# Patient Record
Sex: MALE | Race: WHITE | NOT HISPANIC OR LATINO | Employment: FULL TIME | URBAN - METROPOLITAN AREA
[De-identification: names, ages, dates, MRNs, and addresses within clinical notes are randomized per-mention and may not be internally consistent; named-entity substitution may affect disease eponyms.]

---

## 2019-10-26 ENCOUNTER — HOSPITAL ENCOUNTER (OUTPATIENT)
Facility: HOSPITAL | Age: 69
Setting detail: OBSERVATION
Discharge: HOME | End: 2019-10-28
Attending: EMERGENCY MEDICINE | Admitting: INTERNAL MEDICINE

## 2019-10-26 ENCOUNTER — APPOINTMENT (OUTPATIENT)
Dept: GENERAL RADIOLOGY | Facility: HOSPITAL | Age: 69
End: 2019-10-26

## 2019-10-26 DIAGNOSIS — R77.8 ELEVATED TROPONIN: ICD-10-CM

## 2019-10-26 DIAGNOSIS — Z95.1 STATUS POST CORONARY ARTERY BYPASS GRAFT: ICD-10-CM

## 2019-10-26 DIAGNOSIS — I50.9 ACUTE CONGESTIVE HEART FAILURE, UNSPECIFIED HEART FAILURE TYPE (HCC): Primary | ICD-10-CM

## 2019-10-26 DIAGNOSIS — J98.01 BRONCHOSPASM: ICD-10-CM

## 2019-10-26 LAB
ANION GAP SERPL CALCULATED.3IONS-SCNC: 12 MMOL/L (ref 5–15)
ANION GAP SERPL CALCULATED.3IONS-SCNC: 13 MMOL/L (ref 5–15)
BASOPHILS # BLD AUTO: 0.1 10*3/MM3 (ref 0–0.2)
BASOPHILS NFR BLD AUTO: 0.9 % (ref 0–1.5)
BUN BLD-MCNC: 22 MG/DL (ref 8–23)
BUN BLD-MCNC: 22 MG/DL (ref 8–23)
BUN/CREAT SERPL: 13.8 (ref 7–25)
BUN/CREAT SERPL: 14.2 (ref 7–25)
CALCIUM SPEC-SCNC: 8.7 MG/DL (ref 8.6–10.5)
CALCIUM SPEC-SCNC: 9 MG/DL (ref 8.6–10.5)
CHLORIDE SERPL-SCNC: 100 MMOL/L (ref 98–107)
CHLORIDE SERPL-SCNC: 95 MMOL/L (ref 98–107)
CO2 SERPL-SCNC: 25 MMOL/L (ref 22–29)
CO2 SERPL-SCNC: 25 MMOL/L (ref 22–29)
CREAT BLD-MCNC: 1.55 MG/DL (ref 0.76–1.27)
CREAT BLD-MCNC: 1.6 MG/DL (ref 0.76–1.27)
DEPRECATED RDW RBC AUTO: 50.3 FL (ref 37–54)
EOSINOPHIL # BLD AUTO: 0.1 10*3/MM3 (ref 0–0.4)
EOSINOPHIL NFR BLD AUTO: 0.9 % (ref 0.3–6.2)
ERYTHROCYTE [DISTWIDTH] IN BLOOD BY AUTOMATED COUNT: 16.6 % (ref 12.3–15.4)
GFR SERPL CREATININE-BSD FRML MDRD: 43 ML/MIN/1.73
GFR SERPL CREATININE-BSD FRML MDRD: 45 ML/MIN/1.73
GLUCOSE BLD-MCNC: 147 MG/DL (ref 65–99)
GLUCOSE BLD-MCNC: 182 MG/DL (ref 65–99)
HCT VFR BLD AUTO: 33.8 % (ref 37.5–51)
HGB BLD-MCNC: 11 G/DL (ref 13–17.7)
INR PPP: 1.64 (ref 2–3)
INR PPP: 1.87 (ref 2–3)
LYMPHOCYTES # BLD AUTO: 1 10*3/MM3 (ref 0.7–3.1)
LYMPHOCYTES NFR BLD AUTO: 8.3 % (ref 19.6–45.3)
MCH RBC QN AUTO: 27.6 PG (ref 26.6–33)
MCHC RBC AUTO-ENTMCNC: 32.6 G/DL (ref 31.5–35.7)
MCV RBC AUTO: 84.6 FL (ref 79–97)
MONOCYTES # BLD AUTO: 1 10*3/MM3 (ref 0.1–0.9)
MONOCYTES NFR BLD AUTO: 7.7 % (ref 5–12)
NEUTROPHILS # BLD AUTO: 10.1 10*3/MM3 (ref 1.7–7)
NEUTROPHILS NFR BLD AUTO: 82.2 % (ref 42.7–76)
NRBC BLD AUTO-RTO: 0.1 /100 WBC (ref 0–0.2)
NT-PROBNP SERPL-MCNC: 2312 PG/ML (ref 5–900)
NT-PROBNP SERPL-MCNC: 2763 PG/ML (ref 5–900)
PLATELET # BLD AUTO: 555 10*3/MM3 (ref 140–450)
PMV BLD AUTO: 8.4 FL (ref 6–12)
POTASSIUM BLD-SCNC: 4.4 MMOL/L (ref 3.5–5.2)
POTASSIUM BLD-SCNC: 4.7 MMOL/L (ref 3.5–5.2)
PROTHROMBIN TIME: 16 SECONDS (ref 19.4–28.5)
PROTHROMBIN TIME: 18 SECONDS (ref 19.4–28.5)
RBC # BLD AUTO: 4 10*6/MM3 (ref 4.14–5.8)
SODIUM BLD-SCNC: 133 MMOL/L (ref 136–145)
SODIUM BLD-SCNC: 137 MMOL/L (ref 136–145)
TROPONIN T SERPL-MCNC: 0.13 NG/ML (ref 0–0.03)
TROPONIN T SERPL-MCNC: 0.14 NG/ML (ref 0–0.03)
TROPONIN T SERPL-MCNC: 0.14 NG/ML (ref 0–0.03)
TROPONIN T SERPL-MCNC: 0.15 NG/ML (ref 0–0.03)
WBC NRBC COR # BLD: 12.3 10*3/MM3 (ref 3.4–10.8)

## 2019-10-26 PROCEDURE — 84484 ASSAY OF TROPONIN QUANT: CPT | Performed by: NURSE PRACTITIONER

## 2019-10-26 PROCEDURE — G0378 HOSPITAL OBSERVATION PER HR: HCPCS

## 2019-10-26 PROCEDURE — 93005 ELECTROCARDIOGRAM TRACING: CPT | Performed by: EMERGENCY MEDICINE

## 2019-10-26 PROCEDURE — 80048 BASIC METABOLIC PNL TOTAL CA: CPT | Performed by: EMERGENCY MEDICINE

## 2019-10-26 PROCEDURE — 25010000002 FUROSEMIDE PER 20 MG: Performed by: EMERGENCY MEDICINE

## 2019-10-26 PROCEDURE — 83880 ASSAY OF NATRIURETIC PEPTIDE: CPT | Performed by: NURSE PRACTITIONER

## 2019-10-26 PROCEDURE — 99220 PR INITIAL OBSERVATION CARE/DAY 70 MINUTES: CPT | Performed by: INTERNAL MEDICINE

## 2019-10-26 PROCEDURE — 94640 AIRWAY INHALATION TREATMENT: CPT

## 2019-10-26 PROCEDURE — 71045 X-RAY EXAM CHEST 1 VIEW: CPT

## 2019-10-26 PROCEDURE — 84484 ASSAY OF TROPONIN QUANT: CPT | Performed by: EMERGENCY MEDICINE

## 2019-10-26 PROCEDURE — 85025 COMPLETE CBC W/AUTO DIFF WBC: CPT | Performed by: EMERGENCY MEDICINE

## 2019-10-26 PROCEDURE — 80048 BASIC METABOLIC PNL TOTAL CA: CPT | Performed by: NURSE PRACTITIONER

## 2019-10-26 PROCEDURE — 96376 TX/PRO/DX INJ SAME DRUG ADON: CPT

## 2019-10-26 PROCEDURE — 85610 PROTHROMBIN TIME: CPT | Performed by: INTERNAL MEDICINE

## 2019-10-26 PROCEDURE — 94799 UNLISTED PULMONARY SVC/PX: CPT

## 2019-10-26 PROCEDURE — 83880 ASSAY OF NATRIURETIC PEPTIDE: CPT | Performed by: EMERGENCY MEDICINE

## 2019-10-26 PROCEDURE — 25010000002 FUROSEMIDE PER 20 MG: Performed by: NURSE PRACTITIONER

## 2019-10-26 PROCEDURE — 99205 OFFICE O/P NEW HI 60 MIN: CPT | Performed by: NURSE PRACTITIONER

## 2019-10-26 PROCEDURE — 96374 THER/PROPH/DIAG INJ IV PUSH: CPT

## 2019-10-26 PROCEDURE — 99285 EMERGENCY DEPT VISIT HI MDM: CPT

## 2019-10-26 PROCEDURE — 94760 N-INVAS EAR/PLS OXIMETRY 1: CPT

## 2019-10-26 PROCEDURE — 85610 PROTHROMBIN TIME: CPT | Performed by: EMERGENCY MEDICINE

## 2019-10-26 RX ORDER — WARFARIN SODIUM 5 MG/1
5 TABLET ORAL
Status: DISCONTINUED | OUTPATIENT
Start: 2019-10-26 | End: 2019-10-26 | Stop reason: SDUPTHER

## 2019-10-26 RX ORDER — PANTOPRAZOLE SODIUM 40 MG/1
40 TABLET, DELAYED RELEASE ORAL EVERY MORNING
Status: DISCONTINUED | OUTPATIENT
Start: 2019-10-27 | End: 2019-10-28 | Stop reason: HOSPADM

## 2019-10-26 RX ORDER — TRAMADOL HYDROCHLORIDE 50 MG/1
50 TABLET ORAL EVERY 6 HOURS PRN
Status: DISCONTINUED | OUTPATIENT
Start: 2019-10-26 | End: 2019-10-28 | Stop reason: HOSPADM

## 2019-10-26 RX ORDER — CARVEDILOL 6.25 MG/1
12.5 TABLET ORAL 2 TIMES DAILY WITH MEALS
Status: DISCONTINUED | OUTPATIENT
Start: 2019-10-26 | End: 2019-10-27

## 2019-10-26 RX ORDER — ASPIRIN 81 MG/1
81 TABLET, CHEWABLE ORAL DAILY
Status: DISCONTINUED | OUTPATIENT
Start: 2019-10-26 | End: 2019-10-26

## 2019-10-26 RX ORDER — ATORVASTATIN CALCIUM 40 MG/1
40 TABLET, FILM COATED ORAL NIGHTLY
Status: DISCONTINUED | OUTPATIENT
Start: 2019-10-26 | End: 2019-10-28 | Stop reason: HOSPADM

## 2019-10-26 RX ORDER — FUROSEMIDE 40 MG/1
40 TABLET ORAL DAILY
Status: DISCONTINUED | OUTPATIENT
Start: 2019-10-28 | End: 2019-10-28 | Stop reason: HOSPADM

## 2019-10-26 RX ORDER — WARFARIN SODIUM 5 MG/1
5 TABLET ORAL
COMMUNITY

## 2019-10-26 RX ORDER — AMIODARONE HYDROCHLORIDE 200 MG/1
200 TABLET ORAL
Status: DISCONTINUED | OUTPATIENT
Start: 2019-10-26 | End: 2019-10-26

## 2019-10-26 RX ORDER — ATORVASTATIN CALCIUM 40 MG/1
40 TABLET, FILM COATED ORAL DAILY
Status: DISCONTINUED | OUTPATIENT
Start: 2019-10-26 | End: 2019-10-26

## 2019-10-26 RX ORDER — ATORVASTATIN CALCIUM 40 MG/1
40 TABLET, FILM COATED ORAL DAILY
COMMUNITY

## 2019-10-26 RX ORDER — ASPIRIN 81 MG/1
81 TABLET, CHEWABLE ORAL DAILY
COMMUNITY

## 2019-10-26 RX ORDER — ASPIRIN 81 MG/1
81 TABLET ORAL DAILY
Status: DISCONTINUED | OUTPATIENT
Start: 2019-10-26 | End: 2019-10-26

## 2019-10-26 RX ORDER — CARVEDILOL 12.5 MG/1
12.5 TABLET ORAL 2 TIMES DAILY WITH MEALS
COMMUNITY

## 2019-10-26 RX ORDER — WARFARIN SODIUM 5 MG/1
5 TABLET ORAL
Status: DISCONTINUED | OUTPATIENT
Start: 2019-10-26 | End: 2019-10-28

## 2019-10-26 RX ORDER — AMIODARONE HYDROCHLORIDE 200 MG/1
TABLET ORAL 3 TIMES DAILY
COMMUNITY

## 2019-10-26 RX ORDER — SODIUM CHLORIDE 0.9 % (FLUSH) 0.9 %
10 SYRINGE (ML) INJECTION AS NEEDED
Status: DISCONTINUED | OUTPATIENT
Start: 2019-10-26 | End: 2019-10-28 | Stop reason: HOSPADM

## 2019-10-26 RX ORDER — FUROSEMIDE 10 MG/ML
40 INJECTION INTRAMUSCULAR; INTRAVENOUS
Status: COMPLETED | OUTPATIENT
Start: 2019-10-26 | End: 2019-10-27

## 2019-10-26 RX ORDER — SODIUM CHLORIDE 0.9 % (FLUSH) 0.9 %
10 SYRINGE (ML) INJECTION EVERY 12 HOURS SCHEDULED
Status: DISCONTINUED | OUTPATIENT
Start: 2019-10-26 | End: 2019-10-28 | Stop reason: HOSPADM

## 2019-10-26 RX ORDER — ALBUTEROL SULFATE 2.5 MG/3ML
2.5 SOLUTION RESPIRATORY (INHALATION) EVERY 6 HOURS PRN
Status: DISCONTINUED | OUTPATIENT
Start: 2019-10-26 | End: 2019-10-27

## 2019-10-26 RX ORDER — AMIODARONE HYDROCHLORIDE 200 MG/1
400 TABLET ORAL 3 TIMES DAILY
Status: DISCONTINUED | OUTPATIENT
Start: 2019-10-26 | End: 2019-10-28 | Stop reason: HOSPADM

## 2019-10-26 RX ORDER — AMIODARONE HYDROCHLORIDE 200 MG/1
200 TABLET ORAL
Status: DISCONTINUED | OUTPATIENT
Start: 2019-11-09 | End: 2019-10-28 | Stop reason: HOSPADM

## 2019-10-26 RX ORDER — ATORVASTATIN CALCIUM 40 MG/1
40 TABLET, FILM COATED ORAL DAILY
COMMUNITY
Start: 2019-10-26 | End: 2019-11-25

## 2019-10-26 RX ORDER — AMIODARONE HYDROCHLORIDE 400 MG/1
400 TABLET ORAL 3 TIMES DAILY
COMMUNITY
Start: 2019-10-25 | End: 2019-10-28 | Stop reason: HOSPADM

## 2019-10-26 RX ORDER — ASPIRIN 81 MG/1
81 TABLET ORAL DAILY
Status: DISCONTINUED | OUTPATIENT
Start: 2019-10-26 | End: 2019-10-28 | Stop reason: HOSPADM

## 2019-10-26 RX ORDER — ASPIRIN 81 MG/1
81 TABLET ORAL DAILY
COMMUNITY
Start: 2019-10-26 | End: 2019-10-28 | Stop reason: HOSPADM

## 2019-10-26 RX ORDER — WARFARIN SODIUM 5 MG/1
TABLET ORAL
COMMUNITY
Start: 2019-10-26 | End: 2019-10-28 | Stop reason: HOSPADM

## 2019-10-26 RX ORDER — ALBUTEROL SULFATE 2.5 MG/3ML
2.5 SOLUTION RESPIRATORY (INHALATION) ONCE
Status: COMPLETED | OUTPATIENT
Start: 2019-10-26 | End: 2019-10-26

## 2019-10-26 RX ORDER — FUROSEMIDE 10 MG/ML
40 INJECTION INTRAMUSCULAR; INTRAVENOUS ONCE
Status: COMPLETED | OUTPATIENT
Start: 2019-10-26 | End: 2019-10-26

## 2019-10-26 RX ORDER — GUAIFENESIN 600 MG/1
600 TABLET, EXTENDED RELEASE ORAL EVERY 12 HOURS SCHEDULED
Status: DISCONTINUED | OUTPATIENT
Start: 2019-10-26 | End: 2019-10-28 | Stop reason: HOSPADM

## 2019-10-26 RX ORDER — TRAMADOL HYDROCHLORIDE 50 MG/1
50 TABLET ORAL EVERY 6 HOURS PRN
COMMUNITY

## 2019-10-26 RX ADMIN — FUROSEMIDE 40 MG: 10 INJECTION, SOLUTION INTRAVENOUS at 05:50

## 2019-10-26 RX ADMIN — ALBUTEROL SULFATE 2.5 MG: 2.5 SOLUTION RESPIRATORY (INHALATION) at 18:34

## 2019-10-26 RX ADMIN — ALBUTEROL SULFATE 2.5 MG: 2.5 SOLUTION RESPIRATORY (INHALATION) at 05:38

## 2019-10-26 RX ADMIN — CARVEDILOL 12.5 MG: 6.25 TABLET, FILM COATED ORAL at 18:14

## 2019-10-26 RX ADMIN — Medication 10 ML: at 09:45

## 2019-10-26 RX ADMIN — AMIODARONE HYDROCHLORIDE 400 MG: 200 TABLET ORAL at 14:54

## 2019-10-26 RX ADMIN — AMIODARONE HYDROCHLORIDE 400 MG: 200 TABLET ORAL at 21:43

## 2019-10-26 RX ADMIN — ASPIRIN 81 MG: 81 TABLET, COATED ORAL at 14:55

## 2019-10-26 RX ADMIN — WARFARIN SODIUM 5 MG: 5 TABLET ORAL at 18:14

## 2019-10-26 RX ADMIN — GUAIFENESIN 600 MG: 600 TABLET, EXTENDED RELEASE ORAL at 21:43

## 2019-10-26 RX ADMIN — ATORVASTATIN CALCIUM 40 MG: 40 TABLET, FILM COATED ORAL at 21:43

## 2019-10-26 RX ADMIN — Medication 10 ML: at 21:44

## 2019-10-26 RX ADMIN — FUROSEMIDE 40 MG: 10 INJECTION, SOLUTION INTRAMUSCULAR; INTRAVENOUS at 18:14

## 2019-10-27 PROBLEM — Z95.1 STATUS POST CORONARY ARTERY BYPASS GRAFT: Status: ACTIVE | Noted: 2019-10-27

## 2019-10-27 PROBLEM — R77.8 ELEVATED TROPONIN: Status: ACTIVE | Noted: 2019-10-27

## 2019-10-27 PROBLEM — R79.89 ELEVATED TROPONIN: Status: ACTIVE | Noted: 2019-10-27

## 2019-10-27 PROBLEM — K21.9 GERD (GASTROESOPHAGEAL REFLUX DISEASE): Chronic | Status: ACTIVE | Noted: 2019-10-27

## 2019-10-27 LAB
ANION GAP SERPL CALCULATED.3IONS-SCNC: 14 MMOL/L (ref 5–15)
BUN BLD-MCNC: 22 MG/DL (ref 8–23)
BUN/CREAT SERPL: 15.2 (ref 7–25)
CALCIUM SPEC-SCNC: 8.9 MG/DL (ref 8.6–10.5)
CHLORIDE SERPL-SCNC: 98 MMOL/L (ref 98–107)
CO2 SERPL-SCNC: 25 MMOL/L (ref 22–29)
CREAT BLD-MCNC: 1.45 MG/DL (ref 0.76–1.27)
GFR SERPL CREATININE-BSD FRML MDRD: 48 ML/MIN/1.73
GLUCOSE BLD-MCNC: 148 MG/DL (ref 65–99)
INR PPP: 2.06 (ref 2–3)
POTASSIUM BLD-SCNC: 4.1 MMOL/L (ref 3.5–5.2)
PROTHROMBIN TIME: 19.8 SECONDS (ref 19.4–28.5)
SODIUM BLD-SCNC: 137 MMOL/L (ref 136–145)

## 2019-10-27 PROCEDURE — 99225 PR SBSQ OBSERVATION CARE/DAY 25 MINUTES: CPT | Performed by: INTERNAL MEDICINE

## 2019-10-27 PROCEDURE — 94640 AIRWAY INHALATION TREATMENT: CPT

## 2019-10-27 PROCEDURE — 94799 UNLISTED PULMONARY SVC/PX: CPT

## 2019-10-27 PROCEDURE — 96376 TX/PRO/DX INJ SAME DRUG ADON: CPT

## 2019-10-27 PROCEDURE — 99214 OFFICE O/P EST MOD 30 MIN: CPT | Performed by: NURSE PRACTITIONER

## 2019-10-27 PROCEDURE — 80048 BASIC METABOLIC PNL TOTAL CA: CPT | Performed by: NURSE PRACTITIONER

## 2019-10-27 PROCEDURE — 85610 PROTHROMBIN TIME: CPT | Performed by: INTERNAL MEDICINE

## 2019-10-27 PROCEDURE — 25010000002 FUROSEMIDE PER 20 MG: Performed by: NURSE PRACTITIONER

## 2019-10-27 PROCEDURE — G0378 HOSPITAL OBSERVATION PER HR: HCPCS

## 2019-10-27 RX ORDER — CARVEDILOL 6.25 MG/1
6.25 TABLET ORAL 2 TIMES DAILY WITH MEALS
Status: DISCONTINUED | OUTPATIENT
Start: 2019-10-27 | End: 2019-10-28 | Stop reason: HOSPADM

## 2019-10-27 RX ORDER — ARFORMOTEROL TARTRATE 15 UG/2ML
15 SOLUTION RESPIRATORY (INHALATION)
Status: DISCONTINUED | OUTPATIENT
Start: 2019-10-27 | End: 2019-10-28 | Stop reason: HOSPADM

## 2019-10-27 RX ORDER — IPRATROPIUM BROMIDE AND ALBUTEROL SULFATE 2.5; .5 MG/3ML; MG/3ML
3 SOLUTION RESPIRATORY (INHALATION)
Status: DISCONTINUED | OUTPATIENT
Start: 2019-10-27 | End: 2019-10-28 | Stop reason: HOSPADM

## 2019-10-27 RX ORDER — BUDESONIDE 0.5 MG/2ML
0.5 INHALANT ORAL
Status: DISCONTINUED | OUTPATIENT
Start: 2019-10-27 | End: 2019-10-28 | Stop reason: HOSPADM

## 2019-10-27 RX ADMIN — ASPIRIN 81 MG: 81 TABLET, COATED ORAL at 09:45

## 2019-10-27 RX ADMIN — FUROSEMIDE 40 MG: 10 INJECTION, SOLUTION INTRAMUSCULAR; INTRAVENOUS at 09:44

## 2019-10-27 RX ADMIN — ATORVASTATIN CALCIUM 40 MG: 40 TABLET, FILM COATED ORAL at 20:46

## 2019-10-27 RX ADMIN — PANTOPRAZOLE SODIUM 40 MG: 40 TABLET, DELAYED RELEASE ORAL at 06:16

## 2019-10-27 RX ADMIN — WARFARIN SODIUM 5 MG: 5 TABLET ORAL at 17:41

## 2019-10-27 RX ADMIN — CARVEDILOL 12.5 MG: 6.25 TABLET, FILM COATED ORAL at 08:00

## 2019-10-27 RX ADMIN — CARVEDILOL 6.25 MG: 6.25 TABLET, FILM COATED ORAL at 17:41

## 2019-10-27 RX ADMIN — ALBUTEROL SULFATE 2.5 MG: 2.5 SOLUTION RESPIRATORY (INHALATION) at 05:18

## 2019-10-27 RX ADMIN — AMIODARONE HYDROCHLORIDE 400 MG: 200 TABLET ORAL at 16:00

## 2019-10-27 RX ADMIN — Medication 10 ML: at 09:44

## 2019-10-27 RX ADMIN — Medication 10 ML: at 20:46

## 2019-10-27 RX ADMIN — IPRATROPIUM BROMIDE AND ALBUTEROL SULFATE 3 ML: .5; 3 SOLUTION RESPIRATORY (INHALATION) at 14:52

## 2019-10-27 RX ADMIN — GUAIFENESIN 600 MG: 600 TABLET, EXTENDED RELEASE ORAL at 20:46

## 2019-10-27 RX ADMIN — IPRATROPIUM BROMIDE AND ALBUTEROL SULFATE 3 ML: .5; 3 SOLUTION RESPIRATORY (INHALATION) at 19:57

## 2019-10-27 RX ADMIN — ARFORMOTEROL TARTRATE 15 MCG: 15 SOLUTION RESPIRATORY (INHALATION) at 19:57

## 2019-10-27 RX ADMIN — BUDESONIDE 0.5 MG: 0.5 INHALANT RESPIRATORY (INHALATION) at 19:57

## 2019-10-27 RX ADMIN — GUAIFENESIN 600 MG: 600 TABLET, EXTENDED RELEASE ORAL at 09:45

## 2019-10-27 RX ADMIN — AMIODARONE HYDROCHLORIDE 400 MG: 200 TABLET ORAL at 20:46

## 2019-10-27 RX ADMIN — AMIODARONE HYDROCHLORIDE 400 MG: 200 TABLET ORAL at 09:44

## 2019-10-28 VITALS
OXYGEN SATURATION: 98 % | HEART RATE: 80 BPM | RESPIRATION RATE: 18 BRPM | HEIGHT: 75 IN | WEIGHT: 209.66 LBS | DIASTOLIC BLOOD PRESSURE: 67 MMHG | SYSTOLIC BLOOD PRESSURE: 101 MMHG | TEMPERATURE: 97.4 F | BODY MASS INDEX: 26.07 KG/M2

## 2019-10-28 LAB
INR PPP: 2.48 (ref 2–3)
PROTHROMBIN TIME: 23.5 SECONDS (ref 19.4–28.5)

## 2019-10-28 PROCEDURE — 99217 PR OBSERVATION CARE DISCHARGE MANAGEMENT: CPT | Performed by: INTERNAL MEDICINE

## 2019-10-28 PROCEDURE — 94799 UNLISTED PULMONARY SVC/PX: CPT

## 2019-10-28 PROCEDURE — 99214 OFFICE O/P EST MOD 30 MIN: CPT | Performed by: INTERNAL MEDICINE

## 2019-10-28 PROCEDURE — 85610 PROTHROMBIN TIME: CPT | Performed by: INTERNAL MEDICINE

## 2019-10-28 PROCEDURE — 94618 PULMONARY STRESS TESTING: CPT

## 2019-10-28 PROCEDURE — G0378 HOSPITAL OBSERVATION PER HR: HCPCS

## 2019-10-28 PROCEDURE — 97116 GAIT TRAINING THERAPY: CPT

## 2019-10-28 PROCEDURE — 97162 PT EVAL MOD COMPLEX 30 MIN: CPT

## 2019-10-28 RX ORDER — WARFARIN SODIUM 4 MG/1
4 TABLET ORAL
Status: DISCONTINUED | OUTPATIENT
Start: 2019-10-28 | End: 2019-10-28 | Stop reason: HOSPADM

## 2019-10-28 RX ORDER — LISINOPRIL 5 MG/1
2.5 TABLET ORAL
Status: DISCONTINUED | OUTPATIENT
Start: 2019-10-28 | End: 2019-10-28 | Stop reason: HOSPADM

## 2019-10-28 RX ORDER — LISINOPRIL 2.5 MG/1
2.5 TABLET ORAL
Qty: 30 TABLET | Refills: 1 | Status: SHIPPED | OUTPATIENT
Start: 2019-10-29

## 2019-10-28 RX ORDER — FUROSEMIDE 40 MG/1
40 TABLET ORAL DAILY
Qty: 30 TABLET | Refills: 2 | Status: SHIPPED | OUTPATIENT
Start: 2019-10-29

## 2019-10-28 RX ADMIN — PANTOPRAZOLE SODIUM 40 MG: 40 TABLET, DELAYED RELEASE ORAL at 05:43

## 2019-10-28 RX ADMIN — CARVEDILOL 6.25 MG: 6.25 TABLET, FILM COATED ORAL at 08:43

## 2019-10-28 RX ADMIN — IPRATROPIUM BROMIDE AND ALBUTEROL SULFATE 3 ML: .5; 3 SOLUTION RESPIRATORY (INHALATION) at 10:41

## 2019-10-28 RX ADMIN — PANTOPRAZOLE SODIUM 40 MG: 40 TABLET, DELAYED RELEASE ORAL at 08:43

## 2019-10-28 RX ADMIN — IPRATROPIUM BROMIDE AND ALBUTEROL SULFATE 3 ML: .5; 3 SOLUTION RESPIRATORY (INHALATION) at 06:52

## 2019-10-28 RX ADMIN — FUROSEMIDE 40 MG: 40 TABLET ORAL at 08:39

## 2019-10-28 RX ADMIN — IPRATROPIUM BROMIDE AND ALBUTEROL SULFATE 3 ML: .5; 3 SOLUTION RESPIRATORY (INHALATION) at 14:30

## 2019-10-28 RX ADMIN — ASPIRIN 81 MG: 81 TABLET, COATED ORAL at 08:43

## 2019-10-28 RX ADMIN — BUDESONIDE 0.5 MG: 0.5 INHALANT RESPIRATORY (INHALATION) at 06:53

## 2019-10-28 RX ADMIN — GUAIFENESIN 600 MG: 600 TABLET, EXTENDED RELEASE ORAL at 08:38

## 2019-10-28 RX ADMIN — ARFORMOTEROL TARTRATE 15 MCG: 15 SOLUTION RESPIRATORY (INHALATION) at 06:52

## 2019-10-28 RX ADMIN — LISINOPRIL 2.5 MG: 5 TABLET ORAL at 13:03

## 2019-10-28 RX ADMIN — AMIODARONE HYDROCHLORIDE 400 MG: 200 TABLET ORAL at 08:38

## 2019-10-28 RX ADMIN — Medication 10 ML: at 08:43

## 2019-10-29 ENCOUNTER — READMISSION MANAGEMENT (OUTPATIENT)
Dept: CALL CENTER | Facility: HOSPITAL | Age: 69
End: 2019-10-29

## 2019-10-29 NOTE — OUTREACH NOTE
Prep Survey      Responses   Facility patient discharged from?  [facility]   Is patient eligible?  Yes   Discharge diagnosis  Acute CHF   Does the patient have one of the following disease processes/diagnoses(primary or secondary)?  CHF   Does the patient have Home health ordered?  No   Is there a DME ordered?  No   Medication alerts for this patient  coumadin change   Prep survey completed?  Yes          Elizabeth Aponte RN

## 2019-10-30 ENCOUNTER — READMISSION MANAGEMENT (OUTPATIENT)
Dept: CALL CENTER | Facility: HOSPITAL | Age: 69
End: 2019-10-30

## 2019-10-30 NOTE — OUTREACH NOTE
CHF Week 1 Survey      Responses   Facility patient discharged from?  [facility]   Does the patient have one of the following disease processes/diagnoses(primary or secondary)?  CHF   Is there a successful TCM telephone encounter documented?  No   CHF Week 1 attempt successful?  No   Call start time  0850   Unsuccessful attempts  Attempt 1 [No answer/Left voicemail]          Niecy Esquivel LPN

## 2019-10-31 ENCOUNTER — READMISSION MANAGEMENT (OUTPATIENT)
Dept: CALL CENTER | Facility: HOSPITAL | Age: 69
End: 2019-10-31

## 2019-10-31 NOTE — OUTREACH NOTE
CHF Week 1 Survey      Responses   Facility patient discharged from?  [facility]   Does the patient have one of the following disease processes/diagnoses(primary or secondary)?  CHF   Is there a successful TCM telephone encounter documented?  No   CHF Week 1 attempt successful?  No   Unsuccessful attempts  Attempt 2 [NO ANSWER, LEFT VM]      RETURNED CALL. PATIENT VOICES NO QUESTIONS OR NEEDS,    Indu Berman LPN

## 2019-12-12 ENCOUNTER — TELEPHONE (OUTPATIENT)
Dept: CARDIAC REHAB | Facility: HOSPITAL | Age: 69
End: 2019-12-12

## 2019-12-12 NOTE — TELEPHONE ENCOUNTER
Pt called in returning my earlier call regarding his insurance verification. Told him of his 20% he would have to pay for CR. Pt states that [facility] told him it would only cost $30, so he is going to call them again to double check.

## 2023-04-20 ENCOUNTER — HOSPITAL ENCOUNTER (INPATIENT)
Facility: HOSPITAL | Age: 73
LOS: 1 days | Discharge: HOME | DRG: 291 | End: 2023-04-22
Attending: EMERGENCY MEDICINE | Admitting: INTERNAL MEDICINE
Payer: MEDICARE

## 2023-04-20 ENCOUNTER — APPOINTMENT (OUTPATIENT)
Dept: GENERAL RADIOLOGY | Facility: HOSPITAL | Age: 73
DRG: 291 | End: 2023-04-20
Payer: MEDICARE

## 2023-04-20 DIAGNOSIS — I48.91 ATRIAL FIBRILLATION WITH RVR: ICD-10-CM

## 2023-04-20 DIAGNOSIS — J96.01 ACUTE RESPIRATORY FAILURE WITH HYPOXIA: ICD-10-CM

## 2023-04-20 DIAGNOSIS — J81.0 ACUTE PULMONARY EDEMA: Primary | ICD-10-CM

## 2023-04-20 DIAGNOSIS — I48.91 ATRIAL FIBRILLATION, UNSPECIFIED TYPE: ICD-10-CM

## 2023-04-20 DIAGNOSIS — I16.0 HYPERTENSIVE URGENCY: ICD-10-CM

## 2023-04-20 PROBLEM — N17.9 AKI (ACUTE KIDNEY INJURY): Status: ACTIVE | Noted: 2023-04-20

## 2023-04-20 PROBLEM — I48.0 PAF (PAROXYSMAL ATRIAL FIBRILLATION): Status: ACTIVE | Noted: 2023-04-20

## 2023-04-20 PROBLEM — R06.00 DYSPNEA: Status: ACTIVE | Noted: 2023-04-20

## 2023-04-20 PROBLEM — J18.9 PNEUMONIA OF BOTH LUNGS DUE TO INFECTIOUS ORGANISM: Status: ACTIVE | Noted: 2023-04-20

## 2023-04-20 PROBLEM — J44.9 COPD (CHRONIC OBSTRUCTIVE PULMONARY DISEASE): Status: ACTIVE | Noted: 2023-04-20

## 2023-04-20 PROBLEM — R97.20 BPH WITH ELEVATED PSA: Status: ACTIVE | Noted: 2022-03-16

## 2023-04-20 PROBLEM — N40.0 BPH WITH ELEVATED PSA: Status: ACTIVE | Noted: 2022-03-16

## 2023-04-20 PROBLEM — I25.10 CAD (CORONARY ARTERY DISEASE): Status: ACTIVE | Noted: 2019-10-16

## 2023-04-20 LAB
ALBUMIN SERPL-MCNC: 4.2 G/DL (ref 3.5–5.2)
ALBUMIN/GLOB SERPL: 1.2 G/DL
ALP SERPL-CCNC: 73 U/L (ref 39–117)
ALT SERPL W P-5'-P-CCNC: 21 U/L (ref 1–41)
ANION GAP SERPL CALCULATED.3IONS-SCNC: 12 MMOL/L (ref 5–15)
AST SERPL-CCNC: 16 U/L (ref 1–40)
B PARAPERT DNA SPEC QL NAA+PROBE: NOT DETECTED
B PERT DNA SPEC QL NAA+PROBE: NOT DETECTED
BASOPHILS # BLD AUTO: 0.05 10*3/MM3 (ref 0–0.2)
BASOPHILS NFR BLD AUTO: 0.7 % (ref 0–1.5)
BILIRUB SERPL-MCNC: 0.6 MG/DL (ref 0–1.2)
BUN SERPL-MCNC: 15 MG/DL (ref 8–23)
BUN/CREAT SERPL: 11.1 (ref 7–25)
C PNEUM DNA NPH QL NAA+NON-PROBE: NOT DETECTED
CALCIUM SPEC-SCNC: 9.2 MG/DL (ref 8.6–10.5)
CHLORIDE SERPL-SCNC: 101 MMOL/L (ref 98–107)
CO2 SERPL-SCNC: 24 MMOL/L (ref 22–29)
CREAT SERPL-MCNC: 1.35 MG/DL (ref 0.76–1.27)
D DIMER PPP FEU-MCNC: 0.56 MCGFEU/ML (ref 0–0.74)
D-LACTATE SERPL-SCNC: 1.9 MMOL/L (ref 0.5–2)
DEPRECATED RDW RBC AUTO: 47.4 FL (ref 37–54)
EGFRCR SERPLBLD CKD-EPI 2021: 55.1 ML/MIN/1.73
EOSINOPHIL # BLD AUTO: 0.22 10*3/MM3 (ref 0–0.4)
EOSINOPHIL NFR BLD AUTO: 3.2 % (ref 0.3–6.2)
ERYTHROCYTE [DISTWIDTH] IN BLOOD BY AUTOMATED COUNT: 14.8 % (ref 12.3–15.4)
FLUAV SUBTYP SPEC NAA+PROBE: NOT DETECTED
FLUBV RNA ISLT QL NAA+PROBE: NOT DETECTED
GEN 5 2HR TROPONIN T REFLEX: 33 NG/L
GLOBULIN UR ELPH-MCNC: 3.4 GM/DL
GLUCOSE SERPL-MCNC: 123 MG/DL (ref 65–99)
HADV DNA SPEC NAA+PROBE: NOT DETECTED
HCOV 229E RNA SPEC QL NAA+PROBE: NOT DETECTED
HCOV HKU1 RNA SPEC QL NAA+PROBE: NOT DETECTED
HCOV NL63 RNA SPEC QL NAA+PROBE: NOT DETECTED
HCOV OC43 RNA SPEC QL NAA+PROBE: NOT DETECTED
HCT VFR BLD AUTO: 47.2 % (ref 37.5–51)
HGB BLD-MCNC: 14.5 G/DL (ref 13–17.7)
HMPV RNA NPH QL NAA+NON-PROBE: NOT DETECTED
HOLD SPECIMEN: NORMAL
HPIV1 RNA ISLT QL NAA+PROBE: NOT DETECTED
HPIV2 RNA SPEC QL NAA+PROBE: NOT DETECTED
HPIV3 RNA NPH QL NAA+PROBE: DETECTED
HPIV4 P GENE NPH QL NAA+PROBE: NOT DETECTED
IMM GRANULOCYTES # BLD AUTO: 0.02 10*3/MM3 (ref 0–0.05)
IMM GRANULOCYTES NFR BLD AUTO: 0.3 % (ref 0–0.5)
INR PPP: 1.3 (ref 0.84–1.13)
INR PPP: 1.34 (ref 0.84–1.13)
LYMPHOCYTES # BLD AUTO: 1.2 10*3/MM3 (ref 0.7–3.1)
LYMPHOCYTES NFR BLD AUTO: 17.2 % (ref 19.6–45.3)
M PNEUMO IGG SER IA-ACNC: NOT DETECTED
MCH RBC QN AUTO: 26.9 PG (ref 26.6–33)
MCHC RBC AUTO-ENTMCNC: 30.7 G/DL (ref 31.5–35.7)
MCV RBC AUTO: 87.6 FL (ref 79–97)
MONOCYTES # BLD AUTO: 0.56 10*3/MM3 (ref 0.1–0.9)
MONOCYTES NFR BLD AUTO: 8 % (ref 5–12)
NEUTROPHILS NFR BLD AUTO: 4.91 10*3/MM3 (ref 1.7–7)
NEUTROPHILS NFR BLD AUTO: 70.6 % (ref 42.7–76)
NRBC BLD AUTO-RTO: 0 /100 WBC (ref 0–0.2)
NT-PROBNP SERPL-MCNC: 2781 PG/ML (ref 0–900)
PLATELET # BLD AUTO: 356 10*3/MM3 (ref 140–450)
PMV BLD AUTO: 10.2 FL (ref 6–12)
POTASSIUM SERPL-SCNC: 4.1 MMOL/L (ref 3.5–5.2)
PROCALCITONIN SERPL-MCNC: 0.04 NG/ML (ref 0–0.25)
PROT SERPL-MCNC: 7.6 G/DL (ref 6–8.5)
PROTHROMBIN TIME: 16.1 SECONDS (ref 11.4–14.4)
PROTHROMBIN TIME: 16.5 SECONDS (ref 11.4–14.4)
RBC # BLD AUTO: 5.39 10*6/MM3 (ref 4.14–5.8)
RHINOVIRUS RNA SPEC NAA+PROBE: NOT DETECTED
RSV RNA NPH QL NAA+NON-PROBE: NOT DETECTED
SARS-COV-2 RNA NPH QL NAA+NON-PROBE: NOT DETECTED
SODIUM SERPL-SCNC: 137 MMOL/L (ref 136–145)
TROPONIN T DELTA: -6 NG/L
TROPONIN T SERPL HS-MCNC: 33 NG/L
TROPONIN T SERPL HS-MCNC: 39 NG/L
TSH SERPL DL<=0.05 MIU/L-ACNC: 10.04 UIU/ML (ref 0.27–4.2)
WBC NRBC COR # BLD: 6.96 10*3/MM3 (ref 3.4–10.8)
WHOLE BLOOD HOLD COAG: NORMAL
WHOLE BLOOD HOLD SPECIMEN: NORMAL

## 2023-04-20 PROCEDURE — 99285 EMERGENCY DEPT VISIT HI MDM: CPT

## 2023-04-20 PROCEDURE — 99223 1ST HOSP IP/OBS HIGH 75: CPT | Performed by: PHYSICIAN ASSISTANT

## 2023-04-20 PROCEDURE — 25010000002 CEFTRIAXONE PER 250 MG: Performed by: INTERNAL MEDICINE

## 2023-04-20 PROCEDURE — 83605 ASSAY OF LACTIC ACID: CPT | Performed by: PHYSICIAN ASSISTANT

## 2023-04-20 PROCEDURE — 99152 MOD SED SAME PHYS/QHP 5/>YRS: CPT

## 2023-04-20 PROCEDURE — 84145 PROCALCITONIN (PCT): CPT | Performed by: EMERGENCY MEDICINE

## 2023-04-20 PROCEDURE — 84443 ASSAY THYROID STIM HORMONE: CPT | Performed by: PHYSICIAN ASSISTANT

## 2023-04-20 PROCEDURE — 5A2204Z RESTORATION OF CARDIAC RHYTHM, SINGLE: ICD-10-PCS | Performed by: EMERGENCY MEDICINE

## 2023-04-20 PROCEDURE — 0202U NFCT DS 22 TRGT SARS-COV-2: CPT | Performed by: PHYSICIAN ASSISTANT

## 2023-04-20 PROCEDURE — 71045 X-RAY EXAM CHEST 1 VIEW: CPT

## 2023-04-20 PROCEDURE — 93005 ELECTROCARDIOGRAM TRACING: CPT

## 2023-04-20 PROCEDURE — 85025 COMPLETE CBC W/AUTO DIFF WBC: CPT | Performed by: EMERGENCY MEDICINE

## 2023-04-20 PROCEDURE — 80053 COMPREHEN METABOLIC PANEL: CPT | Performed by: EMERGENCY MEDICINE

## 2023-04-20 PROCEDURE — 85610 PROTHROMBIN TIME: CPT | Performed by: EMERGENCY MEDICINE

## 2023-04-20 PROCEDURE — 93005 ELECTROCARDIOGRAM TRACING: CPT | Performed by: EMERGENCY MEDICINE

## 2023-04-20 PROCEDURE — 84484 ASSAY OF TROPONIN QUANT: CPT | Performed by: EMERGENCY MEDICINE

## 2023-04-20 PROCEDURE — G0378 HOSPITAL OBSERVATION PER HR: HCPCS

## 2023-04-20 PROCEDURE — 85379 FIBRIN DEGRADATION QUANT: CPT | Performed by: EMERGENCY MEDICINE

## 2023-04-20 PROCEDURE — 36415 COLL VENOUS BLD VENIPUNCTURE: CPT

## 2023-04-20 PROCEDURE — 85610 PROTHROMBIN TIME: CPT | Performed by: PHYSICIAN ASSISTANT

## 2023-04-20 PROCEDURE — 92960 CARDIOVERSION ELECTRIC EXT: CPT

## 2023-04-20 PROCEDURE — 83880 ASSAY OF NATRIURETIC PEPTIDE: CPT | Performed by: EMERGENCY MEDICINE

## 2023-04-20 PROCEDURE — 25010000002 PROPOFOL 10 MG/ML EMULSION: Performed by: EMERGENCY MEDICINE

## 2023-04-20 PROCEDURE — 87040 BLOOD CULTURE FOR BACTERIA: CPT | Performed by: PHYSICIAN ASSISTANT

## 2023-04-20 PROCEDURE — 25010000002 FUROSEMIDE PER 20 MG: Performed by: EMERGENCY MEDICINE

## 2023-04-20 PROCEDURE — 87449 NOS EACH ORGANISM AG IA: CPT | Performed by: PHYSICIAN ASSISTANT

## 2023-04-20 RX ORDER — SODIUM CHLORIDE 0.9 % (FLUSH) 0.9 %
10 SYRINGE (ML) INJECTION EVERY 12 HOURS SCHEDULED
Status: DISCONTINUED | OUTPATIENT
Start: 2023-04-20 | End: 2023-04-22 | Stop reason: HOSPADM

## 2023-04-20 RX ORDER — FUROSEMIDE 40 MG/1
40 TABLET ORAL DAILY
Status: DISCONTINUED | OUTPATIENT
Start: 2023-04-21 | End: 2023-04-20

## 2023-04-20 RX ORDER — HYDROCODONE BITARTRATE AND ACETAMINOPHEN 5; 325 MG/1; MG/1
1 TABLET ORAL EVERY 4 HOURS PRN
Status: DISCONTINUED | OUTPATIENT
Start: 2023-04-20 | End: 2023-04-22 | Stop reason: HOSPADM

## 2023-04-20 RX ORDER — SODIUM CHLORIDE 0.9 % (FLUSH) 0.9 %
10 SYRINGE (ML) INJECTION AS NEEDED
Status: DISCONTINUED | OUTPATIENT
Start: 2023-04-20 | End: 2023-04-22 | Stop reason: HOSPADM

## 2023-04-20 RX ORDER — ONDANSETRON 2 MG/ML
4 INJECTION INTRAMUSCULAR; INTRAVENOUS EVERY 6 HOURS PRN
Status: DISCONTINUED | OUTPATIENT
Start: 2023-04-20 | End: 2023-04-22 | Stop reason: HOSPADM

## 2023-04-20 RX ORDER — NITROGLYCERIN 20 MG/100ML
5-200 INJECTION INTRAVENOUS
Status: DISCONTINUED | OUTPATIENT
Start: 2023-04-20 | End: 2023-04-21

## 2023-04-20 RX ORDER — SODIUM CHLORIDE 9 MG/ML
40 INJECTION, SOLUTION INTRAVENOUS AS NEEDED
Status: DISCONTINUED | OUTPATIENT
Start: 2023-04-20 | End: 2023-04-22 | Stop reason: HOSPADM

## 2023-04-20 RX ORDER — PROPOFOL 10 MG/ML
VIAL (ML) INTRAVENOUS
Status: COMPLETED | OUTPATIENT
Start: 2023-04-20 | End: 2023-04-20

## 2023-04-20 RX ORDER — ASPIRIN 81 MG/1
81 TABLET, CHEWABLE ORAL DAILY
Status: DISCONTINUED | OUTPATIENT
Start: 2023-04-21 | End: 2023-04-22 | Stop reason: HOSPADM

## 2023-04-20 RX ORDER — ACETAMINOPHEN 325 MG/1
650 TABLET ORAL EVERY 4 HOURS PRN
Status: DISCONTINUED | OUTPATIENT
Start: 2023-04-20 | End: 2023-04-22 | Stop reason: HOSPADM

## 2023-04-20 RX ORDER — PANTOPRAZOLE SODIUM 40 MG/1
40 TABLET, DELAYED RELEASE ORAL
Status: DISCONTINUED | OUTPATIENT
Start: 2023-04-21 | End: 2023-04-22 | Stop reason: HOSPADM

## 2023-04-20 RX ORDER — ATORVASTATIN CALCIUM 40 MG/1
40 TABLET, FILM COATED ORAL DAILY
Status: DISCONTINUED | OUTPATIENT
Start: 2023-04-21 | End: 2023-04-22 | Stop reason: HOSPADM

## 2023-04-20 RX ORDER — PROPOFOL 10 MG/ML
20 VIAL (ML) INTRAVENOUS ONCE
Status: DISCONTINUED | OUTPATIENT
Start: 2023-04-20 | End: 2023-04-22 | Stop reason: HOSPADM

## 2023-04-20 RX ORDER — CARVEDILOL 12.5 MG/1
12.5 TABLET ORAL 2 TIMES DAILY WITH MEALS
Status: DISCONTINUED | OUTPATIENT
Start: 2023-04-20 | End: 2023-04-21

## 2023-04-20 RX ORDER — AMIODARONE HYDROCHLORIDE 200 MG/1
200 TABLET ORAL 3 TIMES DAILY
Status: CANCELLED | OUTPATIENT
Start: 2023-04-20

## 2023-04-20 RX ORDER — FUROSEMIDE 10 MG/ML
80 INJECTION INTRAMUSCULAR; INTRAVENOUS ONCE
Status: COMPLETED | OUTPATIENT
Start: 2023-04-20 | End: 2023-04-20

## 2023-04-20 RX ADMIN — NITROGLYCERIN 5 MCG/MIN: 20 INJECTION INTRAVENOUS at 23:09

## 2023-04-20 RX ADMIN — CEFTRIAXONE 1 G: 1 INJECTION, POWDER, FOR SOLUTION INTRAMUSCULAR; INTRAVENOUS at 23:10

## 2023-04-20 RX ADMIN — FUROSEMIDE 80 MG: 10 INJECTION, SOLUTION INTRAMUSCULAR; INTRAVENOUS at 23:09

## 2023-04-20 RX ADMIN — CARVEDILOL 12.5 MG: 12.5 TABLET, FILM COATED ORAL at 23:10

## 2023-04-20 RX ADMIN — PROPOFOL 30 MG: 10 INJECTION, EMULSION INTRAVENOUS at 21:59

## 2023-04-20 RX ADMIN — APIXABAN 5 MG: 5 TABLET, FILM COATED ORAL at 23:10

## 2023-04-20 NOTE — Clinical Note
Level of Care: Telemetry [5]   Diagnosis: Dyspnea [615624]   Admitting Physician: VALERIA YANG III [[id number]]   Attending Physician: VALERIA YANG III [[id number]]

## 2023-04-20 NOTE — ED PROVIDER NOTES
Subjective   History of Present Illness  Patient is a pleasant 73-year-old male with history of coronary artery disease, CABG 3 years ago.  Also has history of atrial fibrillation but he believes he has been in sinus rhythm for the past 2 and half year.  Is anticoagulated with Eliquis.  Lives in [place] and is here in [place] presenting for a conference.  He states that while he was there became somewhat short of breath.  He states actually had felt this coming on for the past 3 days.  For the first 2 days he was taking his inhaler and this seems to relieve his symptoms, but for the past 1 day has not seemed to improve his symptoms and this is the reason for presentation today.  Is primarily present with exertion.  When he is at rest he says he feels much better.  Denies fever, chills, chest pain, abdominal pain, vomiting, diarrhea, or other acute complaint.    Shortness of breath is worse with exertion and lying down flat.  Last night was able to sleep lying down in bed but woke up approximately 1:30 AM short of breath.  Shortness of breath improved with sitting up and standing up.        Review of Systems   All other systems reviewed and are negative.      Past Medical History:   Diagnosis Date   • Cancer     throat- radiation only   • Coronary artery disease    • GERD (gastroesophageal reflux disease)    • Myocardial infarction    • Status post coronary artery bypass graft        No Known Allergies    Past Surgical History:   Procedure Laterality Date   • BYPASS GRAFT         Family History   Family history unknown: Yes       Social History     Socioeconomic History   • Marital status:    Tobacco Use   • Smoking status: Former   • Smokeless tobacco: Never   Substance and Sexual Activity   • Alcohol use: No   • Drug use: Defer   • Sexual activity: Defer           Objective   Physical Exam  Vitals and nursing note reviewed.   Constitutional:       General: He is not in acute distress.  HENT:      Head:  Normocephalic and atraumatic.   Eyes:      Pupils: Pupils are equal, round, and reactive to light.   Neck:      Thyroid: No thyromegaly.      Vascular: No JVD.   Cardiovascular:      Rate and Rhythm: Normal rate. Rhythm irregular.      Heart sounds: Normal heart sounds. No murmur heard.    No friction rub. No gallop.   Pulmonary:      Effort: Respiratory distress (Mildly increased work of breathing) present.      Breath sounds: No wheezing or rales.   Abdominal:      Palpations: Abdomen is soft.      Tenderness: There is no abdominal tenderness.   Musculoskeletal:         General: Normal range of motion.      Cervical back: Normal range of motion and neck supple.      Right lower leg: Edema present.      Left lower leg: Edema present.      Comments: Pronounced bilateral lower extremity edema, 3+ bilaterally   Lymphadenopathy:      Cervical: No cervical adenopathy.   Skin:     General: Skin is warm and dry.   Neurological:      Mental Status: He is alert and oriented to person, place, and time.   Psychiatric:      Comments: Patient mildly anxious.  Sitting straight up in bed.         Critical Care  Performed by: Simon Mccormack DO  Authorized by: Simon Mccormack DO     Critical care provider statement:     Critical care time (minutes):  35    Critical care time was exclusive of:  Separately billable procedures and treating other patients    Critical care was time spent personally by me on the following activities:  Ordering and performing treatments and interventions, ordering and review of laboratory studies, ordering and review of radiographic studies, pulse oximetry, re-evaluation of patient's condition, review of old charts, obtaining history from patient or surrogate, examination of patient, evaluation of patient's response to treatment, discussions with consultants and development of treatment plan with patient or surrogate    I assumed direction of critical care for this patient from another provider in my  specialty: no      Care discussed with: admitting provider    Electrical Cardioversion    Date/Time: 4/20/2023 10:31 PM  Performed by: Simon Mccormack DO  Authorized by: Simon Mccormack DO     Consent:     Consent obtained:  Verbal and written    Consent given by:  Patient    Risks, benefits, and alternatives were discussed: yes      Risks discussed:  Cutaneous burn, death, induced arrhythmia and pain (Stroke)    Alternatives discussed:  Rate-control medication, alternative treatment, referral, delayed treatment and observation  Universal protocol:     Procedure explained and questions answered to patient or proxy's satisfaction: yes      Relevant documents present and verified: yes      Test results available: yes      Imaging studies available: yes      Required blood products, implants, devices, and special equipment available: yes      Immediately prior to procedure, a time out was called: yes      Patient identity confirmed:  Verbally with patient and arm band  Pre-procedure details:     Cardioversion basis:  Elective    Rhythm:  Atrial fibrillation    Electrode placement:  Anterior-posterior  Attempt one:     Cardioversion mode:  Synchronous    Waveform:  Biphasic    Shock (Joules):  120    Shock outcome:  Conversion to normal sinus rhythm  Post-procedure details:     Patient status:  Awake    Procedure completion:  Tolerated well, no immediate complications  Procedural Sedation    Date/Time: 4/20/2023 10:31 PM  Performed by: Simon Mccormack DO  Authorized by: Simon Mccormack DO     Consent:     Consent obtained:  Verbal and written    Consent given by:  Patient    Risks, benefits, and alternatives were discussed: yes      Risks discussed:  Allergic reaction, dysrhythmia, inadequate sedation, nausea, vomiting, prolonged hypoxia resulting in organ damage and respiratory compromise necessitating ventilatory assistance and intubation  Universal protocol:     Procedure explained and questions answered to patient or proxy's  satisfaction: yes      Relevant documents present and verified: yes      Test results available: yes      Imaging studies available: yes      Required blood products, implants, devices, and special equipment available: yes      Immediately prior to procedure, a time out was called: yes      Patient identity confirmed:  Verbally with patient and arm band  Indications:     Procedure performed:  Cardioversion    Procedure necessitating sedation performed by:  Physician performing sedation    Intended level of sedation:  Moderate  Pre-sedation assessment:     Time since last food or drink:  10 hours    ASA classification: class 2 - patient with mild systemic disease      Mouth opening:  3 or more finger widths    Thyromental distance:  3 finger widths    Mallampati score:  III - soft palate, base of uvula visible    Neck mobility: normal      Pre-sedation assessments completed and reviewed: airway patency, anesthesia/sedation history, cardiovascular function, hydration status, mental status, nausea/vomiting, pain level, respiratory function and temperature      History of difficult intubation: no    Immediate pre-procedure details:     Reassessment: Patient reassessed immediately prior to procedure      Reviewed: vital signs, relevant labs/tests and NPO status      Verified: bag valve mask available, emergency equipment available, intubation equipment available, IV patency confirmed, oxygen available and suction available    Procedure details (see MAR for exact dosages):     Preoxygenation:  Nonrebreather mask    Sedation:  Propofol    Intra-procedure monitoring:  Blood pressure monitoring, continuous capnometry, frequent LOC assessments, cardiac monitor, continuous pulse oximetry and frequent vital sign checks    Intra-procedure events: none      Total sedation time (minutes):  16  Post-procedure details:     Attendance: Constant attendance by certified staff until patient recovered      Recovery: Patient returned to  pre-procedure baseline      Post-sedation assessments completed and reviewed: airway patency, cardiovascular function, hydration status, mental status, nausea/vomiting, pain level and respiratory function      Patient is stable for discharge or admission: yes      Procedure completion:  Tolerated well, no immediate complications               ED Course  ED Course as of 04/20/23 2237   Thu Apr 20, 2023 2232 Patient's presentation is more consistent with pulmonary edema than pneumonia.  Treated with 80 of Lasix, nitroglycerin drip was ordered, patient was in need of atrial fibrillation and was electrically cardioverted to normal sinus rhythm.  BiPAP is also been ordered.  BiPAP may not be necessary but given his edema and mildly increased work of breathing I think will benefit from starting this early. [CP]      ED Course User Index  [CP] Simon Mccormack,       Recent Results (from the past 24 hour(s))   ECG 12 Lead Rhythm Change    Collection Time: 04/20/23  4:02 PM   Result Value Ref Range    QT Interval 360 ms    QTC Interval 454 ms   Comprehensive Metabolic Panel    Collection Time: 04/20/23  4:21 PM    Specimen: Blood   Result Value Ref Range    Glucose 123 (H) 65 - 99 mg/dL    BUN 15 8 - 23 mg/dL    Creatinine 1.35 (H) 0.76 - 1.27 mg/dL    Sodium 137 136 - 145 mmol/L    Potassium 4.1 3.5 - 5.2 mmol/L    Chloride 101 98 - 107 mmol/L    CO2 24.0 22.0 - 29.0 mmol/L    Calcium 9.2 8.6 - 10.5 mg/dL    Total Protein 7.6 6.0 - 8.5 g/dL    Albumin 4.2 3.5 - 5.2 g/dL    ALT (SGPT) 21 1 - 41 U/L    AST (SGOT) 16 1 - 40 U/L    Alkaline Phosphatase 73 39 - 117 U/L    Total Bilirubin 0.6 0.0 - 1.2 mg/dL    Globulin 3.4 gm/dL    A/G Ratio 1.2 g/dL    BUN/Creatinine Ratio 11.1 7.0 - 25.0    Anion Gap 12.0 5.0 - 15.0 mmol/L    eGFR 55.1 (L) >60.0 mL/min/1.73   BNP    Collection Time: 04/20/23  4:21 PM    Specimen: Blood   Result Value Ref Range    proBNP 2,781.0 (H) 0.0 - 900.0 pg/mL   Single High Sensitivity Troponin T     Collection Time: 04/20/23  4:21 PM    Specimen: Blood   Result Value Ref Range    HS Troponin T 33 (H) <15 ng/L   Protime-INR    Collection Time: 04/20/23  4:21 PM    Specimen: Blood   Result Value Ref Range    Protime 16.5 (H) 11.4 - 14.4 Seconds    INR 1.34 (H) 0.84 - 1.13   Green Top (Gel)    Collection Time: 04/20/23  4:21 PM   Result Value Ref Range    Extra Tube Hold for add-ons.    Lavender Top    Collection Time: 04/20/23  4:21 PM   Result Value Ref Range    Extra Tube hold for add-on    Gold Top - SST    Collection Time: 04/20/23  4:21 PM   Result Value Ref Range    Extra Tube Hold for add-ons.    Gray Top    Collection Time: 04/20/23  4:21 PM   Result Value Ref Range    Extra Tube Hold for add-ons.    Light Blue Top    Collection Time: 04/20/23  4:21 PM   Result Value Ref Range    Extra Tube Hold for add-ons.    CBC Auto Differential    Collection Time: 04/20/23  4:21 PM    Specimen: Blood   Result Value Ref Range    WBC 6.96 3.40 - 10.80 10*3/mm3    RBC 5.39 4.14 - 5.80 10*6/mm3    Hemoglobin 14.5 13.0 - 17.7 g/dL    Hematocrit 47.2 37.5 - 51.0 %    MCV 87.6 79.0 - 97.0 fL    MCH 26.9 26.6 - 33.0 pg    MCHC 30.7 (L) 31.5 - 35.7 g/dL    RDW 14.8 12.3 - 15.4 %    RDW-SD 47.4 37.0 - 54.0 fl    MPV 10.2 6.0 - 12.0 fL    Platelets 356 140 - 450 10*3/mm3    Neutrophil % 70.6 42.7 - 76.0 %    Lymphocyte % 17.2 (L) 19.6 - 45.3 %    Monocyte % 8.0 5.0 - 12.0 %    Eosinophil % 3.2 0.3 - 6.2 %    Basophil % 0.7 0.0 - 1.5 %    Immature Grans % 0.3 0.0 - 0.5 %    Neutrophils, Absolute 4.91 1.70 - 7.00 10*3/mm3    Lymphocytes, Absolute 1.20 0.70 - 3.10 10*3/mm3    Monocytes, Absolute 0.56 0.10 - 0.90 10*3/mm3    Eosinophils, Absolute 0.22 0.00 - 0.40 10*3/mm3    Basophils, Absolute 0.05 0.00 - 0.20 10*3/mm3    Immature Grans, Absolute 0.02 0.00 - 0.05 10*3/mm3    nRBC 0.0 0.0 - 0.2 /100 WBC   D-dimer, Quantitative    Collection Time: 04/20/23  4:21 PM    Specimen: Blood   Result Value Ref Range    D-Dimer, Quantitative  0.56 0.00 - 0.74 MCGFEU/mL   ECG 12 Lead Dyspnea    Collection Time: 04/20/23  6:19 PM   Result Value Ref Range    QT Interval 384 ms    QTC Interval 459 ms   High Sensitivity Troponin T    Collection Time: 04/20/23  7:21 PM    Specimen: Blood   Result Value Ref Range    HS Troponin T 39 (H) <15 ng/L   Procalcitonin    Collection Time: 04/20/23  7:21 PM    Specimen: Blood   Result Value Ref Range    Procalcitonin 0.04 0.00 - 0.25 ng/mL   TSH    Collection Time: 04/20/23  7:21 PM    Specimen: Blood   Result Value Ref Range    TSH 10.040 (H) 0.270 - 4.200 uIU/mL   ECG 12 Lead Dyspnea    Collection Time: 04/20/23  9:16 PM   Result Value Ref Range    QT Interval 374 ms    QTC Interval 465 ms   High Sensitivity Troponin T 2Hr    Collection Time: 04/20/23  9:44 PM    Specimen: Blood   Result Value Ref Range    HS Troponin T 33 (H) <15 ng/L    Troponin T Delta -6 (L) >=-4 - <+4 ng/L   Lactic Acid, Plasma    Collection Time: 04/20/23  9:44 PM    Specimen: Blood   Result Value Ref Range    Lactate 1.9 0.5 - 2.0 mmol/L   Protime-INR    Collection Time: 04/20/23  9:45 PM    Specimen: Blood   Result Value Ref Range    Protime 16.1 (H) 11.4 - 14.4 Seconds    INR 1.30 (H) 0.84 - 1.13     Note: In addition to lab results from this visit, the labs listed above may include labs taken at another facility or during a different encounter within the last 24 hours. Please correlate lab times with ED admission and discharge times for further clarification of the services performed during this visit.    XR Chest 1 View   Final Result   Impression:   Bibasilar pneumonia with suspected small left-sided pleural effusion. Follow-up to resolution recommended.         Electronically Signed: Gaby Richard     4/20/2023 6:36 PM EDT     Workstation ID: [id number]        Vitals:    04/20/23 2200 04/20/23 2203 04/20/23 2206 04/20/23 2206   BP:    (!) 163/101   BP Location:       Patient Position:       Pulse: 98 87 89 91   Resp:    16   Temp:        TempSrc:       SpO2: 99% 99% 96% 98%   Weight:       Height:         Medications   sodium chloride 0.9 % flush 10 mL (has no administration in time range)   furosemide (LASIX) injection 80 mg (has no administration in time range)   nitroglycerin (TRIDIL) 200 mcg/ml infusion (has no administration in time range)   sodium chloride 0.9 % flush 10 mL (has no administration in time range)   sodium chloride 0.9 % flush 10 mL (has no administration in time range)   sodium chloride 0.9 % infusion 40 mL (has no administration in time range)   acetaminophen (TYLENOL) tablet 650 mg (has no administration in time range)   HYDROcodone-acetaminophen (NORCO) 5-325 MG per tablet 1 tablet (has no administration in time range)   Potassium Replacement - Follow Nurse / BPA Driven Protocol (has no administration in time range)   Magnesium Standard Dose Replacement - Follow Nurse / BPA Driven Protocol (has no administration in time range)   Phosphorus Replacement - Follow Nurse / BPA Driven Protocol (has no administration in time range)   Calcium Replacement - Follow Nurse / BPA Driven Protocol (has no administration in time range)   ondansetron (ZOFRAN) injection 4 mg (has no administration in time range)   cefTRIAXone (ROCEPHIN) 1 g/100 mL 0.9% NS (MBP) (has no administration in time range)   Propofol (DIPRIVAN) injection 20 mg (20 mg Intravenous Not Given 4/20/23 2210)   atorvastatin (LIPITOR) tablet 40 mg (has no administration in time range)   carvedilol (COREG) tablet 12.5 mg (has no administration in time range)   pantoprazole (PROTONIX) EC tablet 40 mg (has no administration in time range)   aspirin chewable tablet 81 mg (has no administration in time range)   doxycycline (VIBRAMYCIN) 100 mg in sodium chloride 0.9 % 250 mL IVPB-VTB (has no administration in time range)   apixaban (ELIQUIS) tablet 5 mg (has no administration in time range)   Propofol (DIPRIVAN) injection (30 mg Intravenous Given 4/20/23 2159)     ECG/EMG Results  (last 24 hours)     Procedure Component Value Units Date/Time    ECG 12 Lead Rhythm Change [[id number]] Collected: 04/20/23 1602     Updated: 04/20/23 1608     QT Interval 360 ms      QTC Interval 454 ms     Narrative:      Test Reason : Rhythm Change  Blood Pressure :   */*   mmHG  Vent. Rate :  96 BPM     Atrial Rate : 375 BPM     P-R Int :   * ms          QRS Dur :  88 ms      QT Int : 360 ms       P-R-T Axes :   *   7  31 degrees     QTc Int : 454 ms    Undetermined rhythm  Otherwise normal ECG  No previous ECGs available    Referred By: EDMD           Confirmed By:     ECG 12 Lead Dyspnea [[id number]] Collected: 04/20/23 1819     Updated: 04/20/23 1820     QT Interval 384 ms      QTC Interval 459 ms     Narrative:      Test Reason : DYSPNEA  Blood Pressure :   */*   mmHG  Vent. Rate :  86 BPM     Atrial Rate : 129 BPM     P-R Int :   * ms          QRS Dur :  86 ms      QT Int : 384 ms       P-R-T Axes :   *  -7  15 degrees     QTc Int : 459 ms    Atrial fibrillation  Possible Inferior infarct , age undetermined  Abnormal ECG  When compared with ECG of 20-APR-2023 16:02, (Unconfirmed)  Previous ECG has undetermined rhythm, needs review  Borderline criteria for Inferior infarct are now present    Referred By:            Confirmed By:     ECG 12 Lead Dyspnea [[id number]] Collected: 04/20/23 2116     Updated: 04/20/23 2116     QT Interval 374 ms      QTC Interval 465 ms     Narrative:      Test Reason : AFIB  Blood Pressure :   */*   mmHG  Vent. Rate :  93 BPM     Atrial Rate : 394 BPM     P-R Int :   * ms          QRS Dur :  86 ms      QT Int : 374 ms       P-R-T Axes :   * -16  18 degrees     QTc Int : 465 ms    Atrial fibrillation with premature ventricular or aberrantly conducted complexes  Abnormal ECG  When compared with ECG of 20-APR-2023 18:19, (Unconfirmed)  No significant change was found    Referred By: EDMD           Confirmed By:         ECG 12 Lead Dyspnea   Preliminary Result   Test Reason : AFIB    Blood Pressure :   */*   mmHG   Vent. Rate :  93 BPM     Atrial Rate : 394 BPM      P-R Int :   * ms          QRS Dur :  86 ms       QT Int : 374 ms       P-R-T Axes :   * -16  18 degrees      QTc Int : 465 ms      Atrial fibrillation with premature ventricular or aberrantly conducted    complexes   Abnormal ECG   When compared with ECG of 20-APR-2023 18:19, (Unconfirmed)   No significant change was found      Referred By: EDMD           Confirmed By:       ECG 12 Lead Dyspnea   Preliminary Result   Test Reason : DYSPNEA   Blood Pressure :   */*   mmHG   Vent. Rate :  86 BPM     Atrial Rate : 129 BPM      P-R Int :   * ms          QRS Dur :  86 ms       QT Int : 384 ms       P-R-T Axes :   *  -7  15 degrees      QTc Int : 459 ms      Atrial fibrillation   Possible Inferior infarct , age undetermined   Abnormal ECG   When compared with ECG of 20-APR-2023 16:02, (Unconfirmed)   Previous ECG has undetermined rhythm, needs review   Borderline criteria for Inferior infarct are now present      Referred By:            Confirmed By:       ECG 12 Lead Rhythm Change   Preliminary Result   Test Reason : Rhythm Change   Blood Pressure :   */*   mmHG   Vent. Rate :  96 BPM     Atrial Rate : 375 BPM      P-R Int :   * ms          QRS Dur :  88 ms       QT Int : 360 ms       P-R-T Axes :   *   7  31 degrees      QTc Int : 454 ms      Undetermined rhythm   Otherwise normal ECG   No previous ECGs available      Referred By: EDMD           Confirmed By:       ECG 12 Lead Rhythm Change    (Results Pending)                                            Medical Decision Making  Patient required emergent intervention for his pulmonary edema and CHF.  Nitro drip was a started to control blood pressure, 80 mg of Lasix was administered and he was electrical cardioverted to return to a normal sinus rhythm.  He feels significantly better following treatment in the ED.  Discussed case with internal medicine attending.  Patient will be admitted  for further evaluation and management.  Attempted to contact Dr. Robledo, cardiology.  Was unsuccessful in contacting cardiology.    Acute pulmonary edema: complicated acute illness or injury  Acute respiratory failure with hypoxia: complicated acute illness or injury  Atrial fibrillation, unspecified type: complicated acute illness or injury  Hypertensive urgency: complicated acute illness or injury  Amount and/or Complexity of Data Reviewed  External Data Reviewed: notes.  Labs: ordered. Decision-making details documented in ED Course.  Radiology: ordered and independent interpretation performed. Decision-making details documented in ED Course.  ECG/medicine tests: ordered and independent interpretation performed. Decision-making details documented in ED Course.      Risk  Prescription drug management.  Decision regarding hospitalization.          Final diagnoses:   Acute pulmonary edema   Hypertensive urgency   Atrial fibrillation, unspecified type   Acute respiratory failure with hypoxia       ED Disposition  ED Disposition     ED Disposition   Decision to Admit    Condition   --    Comment   Level of Care: Telemetry [5]   Diagnosis: Dyspnea [[id number]]   Admitting Physician: VALERIA YANG III [[id number]]   Attending Physician: VALERIA YANG III [[id number]]                  Simon Mccormack DO  04/21/23 0591

## 2023-04-21 ENCOUNTER — APPOINTMENT (OUTPATIENT)
Dept: CARDIOLOGY | Facility: HOSPITAL | Age: 73
DRG: 291 | End: 2023-04-21
Payer: MEDICARE

## 2023-04-21 PROBLEM — J81.0 ACUTE PULMONARY EDEMA: Status: ACTIVE | Noted: 2023-04-21

## 2023-04-21 PROBLEM — B34.8 PARAINFLUENZA: Status: ACTIVE | Noted: 2023-04-21

## 2023-04-21 LAB
ALBUMIN SERPL-MCNC: 3.9 G/DL (ref 3.5–5.2)
ALBUMIN/GLOB SERPL: 1.4 G/DL
ALP SERPL-CCNC: 71 U/L (ref 39–117)
ALT SERPL W P-5'-P-CCNC: 17 U/L (ref 1–41)
ANION GAP SERPL CALCULATED.3IONS-SCNC: 14 MMOL/L (ref 5–15)
AST SERPL-CCNC: 13 U/L (ref 1–40)
BASOPHILS # BLD AUTO: 0.07 10*3/MM3 (ref 0–0.2)
BASOPHILS NFR BLD AUTO: 0.8 % (ref 0–1.5)
BILIRUB SERPL-MCNC: 0.6 MG/DL (ref 0–1.2)
BUN SERPL-MCNC: 18 MG/DL (ref 8–23)
BUN/CREAT SERPL: 12.6 (ref 7–25)
CALCIUM SPEC-SCNC: 9.1 MG/DL (ref 8.6–10.5)
CHLORIDE SERPL-SCNC: 106 MMOL/L (ref 98–107)
CO2 SERPL-SCNC: 27 MMOL/L (ref 22–29)
CREAT SERPL-MCNC: 1.43 MG/DL (ref 0.76–1.27)
DEPRECATED RDW RBC AUTO: 47.1 FL (ref 37–54)
EGFRCR SERPLBLD CKD-EPI 2021: 51.4 ML/MIN/1.73
EOSINOPHIL # BLD AUTO: 0.28 10*3/MM3 (ref 0–0.4)
EOSINOPHIL NFR BLD AUTO: 3.1 % (ref 0.3–6.2)
ERYTHROCYTE [DISTWIDTH] IN BLOOD BY AUTOMATED COUNT: 14.6 % (ref 12.3–15.4)
GLOBULIN UR ELPH-MCNC: 2.8 GM/DL
GLUCOSE SERPL-MCNC: 130 MG/DL (ref 65–99)
HCT VFR BLD AUTO: 43.8 % (ref 37.5–51)
HGB BLD-MCNC: 13.8 G/DL (ref 13–17.7)
IMM GRANULOCYTES # BLD AUTO: 0.05 10*3/MM3 (ref 0–0.05)
IMM GRANULOCYTES NFR BLD AUTO: 0.6 % (ref 0–0.5)
L PNEUMO1 AG UR QL IA: NEGATIVE
LYMPHOCYTES # BLD AUTO: 1.26 10*3/MM3 (ref 0.7–3.1)
LYMPHOCYTES NFR BLD AUTO: 14.1 % (ref 19.6–45.3)
MAGNESIUM SERPL-MCNC: 2 MG/DL (ref 1.6–2.4)
MCH RBC QN AUTO: 27.6 PG (ref 26.6–33)
MCHC RBC AUTO-ENTMCNC: 31.5 G/DL (ref 31.5–35.7)
MCV RBC AUTO: 87.6 FL (ref 79–97)
MONOCYTES # BLD AUTO: 1.14 10*3/MM3 (ref 0.1–0.9)
MONOCYTES NFR BLD AUTO: 12.8 % (ref 5–12)
NEUTROPHILS NFR BLD AUTO: 6.11 10*3/MM3 (ref 1.7–7)
NEUTROPHILS NFR BLD AUTO: 68.6 % (ref 42.7–76)
NRBC BLD AUTO-RTO: 0 /100 WBC (ref 0–0.2)
PLATELET # BLD AUTO: 325 10*3/MM3 (ref 140–450)
PMV BLD AUTO: 10.4 FL (ref 6–12)
POTASSIUM SERPL-SCNC: 4.3 MMOL/L (ref 3.5–5.2)
PROT SERPL-MCNC: 6.7 G/DL (ref 6–8.5)
QT INTERVAL: 360 MS
QT INTERVAL: 374 MS
QT INTERVAL: 384 MS
QTC INTERVAL: 454 MS
QTC INTERVAL: 459 MS
QTC INTERVAL: 465 MS
RBC # BLD AUTO: 5 10*6/MM3 (ref 4.14–5.8)
S PNEUM AG SPEC QL LA: NEGATIVE
SODIUM SERPL-SCNC: 147 MMOL/L (ref 136–145)
T4 FREE SERPL-MCNC: 1.31 NG/DL (ref 0.93–1.7)
WBC NRBC COR # BLD: 8.91 10*3/MM3 (ref 3.4–10.8)

## 2023-04-21 PROCEDURE — 85025 COMPLETE CBC W/AUTO DIFF WBC: CPT | Performed by: INTERNAL MEDICINE

## 2023-04-21 PROCEDURE — 25010000002 CEFTRIAXONE PER 250 MG: Performed by: INTERNAL MEDICINE

## 2023-04-21 PROCEDURE — 80053 COMPREHEN METABOLIC PANEL: CPT | Performed by: INTERNAL MEDICINE

## 2023-04-21 PROCEDURE — 83735 ASSAY OF MAGNESIUM: CPT | Performed by: INTERNAL MEDICINE

## 2023-04-21 PROCEDURE — 99232 SBSQ HOSP IP/OBS MODERATE 35: CPT | Performed by: INTERNAL MEDICINE

## 2023-04-21 PROCEDURE — 99223 1ST HOSP IP/OBS HIGH 75: CPT | Performed by: PHYSICIAN ASSISTANT

## 2023-04-21 PROCEDURE — 84439 ASSAY OF FREE THYROXINE: CPT | Performed by: INTERNAL MEDICINE

## 2023-04-21 PROCEDURE — 93010 ELECTROCARDIOGRAM REPORT: CPT | Performed by: INTERNAL MEDICINE

## 2023-04-21 PROCEDURE — 25010000002 FUROSEMIDE PER 20 MG: Performed by: PHYSICIAN ASSISTANT

## 2023-04-21 PROCEDURE — 93005 ELECTROCARDIOGRAM TRACING: CPT | Performed by: PHYSICIAN ASSISTANT

## 2023-04-21 RX ORDER — TAMSULOSIN HYDROCHLORIDE 0.4 MG/1
1 CAPSULE ORAL NIGHTLY
COMMUNITY

## 2023-04-21 RX ORDER — CARVEDILOL 12.5 MG/1
25 TABLET ORAL 2 TIMES DAILY WITH MEALS
Status: DISCONTINUED | OUTPATIENT
Start: 2023-04-21 | End: 2023-04-22 | Stop reason: HOSPADM

## 2023-04-21 RX ORDER — ALBUTEROL SULFATE 90 UG/1
2 AEROSOL, METERED RESPIRATORY (INHALATION) EVERY 4 HOURS PRN
COMMUNITY

## 2023-04-21 RX ORDER — NITROGLYCERIN 20 MG/100ML
INJECTION INTRAVENOUS
Status: DISCONTINUED
Start: 2023-04-21 | End: 2023-04-21 | Stop reason: WASHOUT

## 2023-04-21 RX ORDER — CARVEDILOL 25 MG/1
25 TABLET ORAL 2 TIMES DAILY WITH MEALS
Qty: 60 TABLET | Refills: 0 | Status: CANCELLED | OUTPATIENT
Start: 2023-04-21

## 2023-04-21 RX ORDER — BUDESONIDE, GLYCOPYRROLATE, AND FORMOTEROL FUMARATE 160; 9; 4.8 UG/1; UG/1; UG/1
2 AEROSOL, METERED RESPIRATORY (INHALATION) 2 TIMES DAILY
COMMUNITY

## 2023-04-21 RX ORDER — FUROSEMIDE 10 MG/ML
40 INJECTION INTRAMUSCULAR; INTRAVENOUS EVERY 12 HOURS
Status: COMPLETED | OUTPATIENT
Start: 2023-04-21 | End: 2023-04-21

## 2023-04-21 RX ADMIN — DOXYCYCLINE 100 MG: 100 INJECTION, POWDER, LYOPHILIZED, FOR SOLUTION INTRAVENOUS at 00:38

## 2023-04-21 RX ADMIN — NITROGLYCERIN 110 MCG/MIN: 20 INJECTION INTRAVENOUS at 09:24

## 2023-04-21 RX ADMIN — MAGNESIUM OXIDE TAB 400 MG (241.3 MG ELEMENTAL MG) 400 MG: 400 (241.3 MG) TAB at 17:16

## 2023-04-21 RX ADMIN — ASPIRIN 81 MG 81 MG: 81 TABLET ORAL at 09:08

## 2023-04-21 RX ADMIN — NITROGLYCERIN 100 MCG/MIN: 20 INJECTION INTRAVENOUS at 10:04

## 2023-04-21 RX ADMIN — CARVEDILOL 12.5 MG: 12.5 TABLET, FILM COATED ORAL at 09:08

## 2023-04-21 RX ADMIN — APIXABAN 5 MG: 5 TABLET, FILM COATED ORAL at 22:17

## 2023-04-21 RX ADMIN — APIXABAN 5 MG: 5 TABLET, FILM COATED ORAL at 09:08

## 2023-04-21 RX ADMIN — DOXYCYCLINE 100 MG: 100 INJECTION, POWDER, LYOPHILIZED, FOR SOLUTION INTRAVENOUS at 22:17

## 2023-04-21 RX ADMIN — HYDROCODONE BITARTRATE AND ACETAMINOPHEN 1 TABLET: 5; 325 TABLET ORAL at 22:29

## 2023-04-21 RX ADMIN — HYDROCODONE BITARTRATE AND ACETAMINOPHEN 1 TABLET: 5; 325 TABLET ORAL at 15:40

## 2023-04-21 RX ADMIN — CEFTRIAXONE 1 G: 1 INJECTION, POWDER, FOR SOLUTION INTRAMUSCULAR; INTRAVENOUS at 23:33

## 2023-04-21 RX ADMIN — ACETAMINOPHEN 325MG 650 MG: 325 TABLET ORAL at 01:53

## 2023-04-21 RX ADMIN — Medication 10 ML: at 22:17

## 2023-04-21 RX ADMIN — Medication 10 ML: at 09:09

## 2023-04-21 RX ADMIN — FUROSEMIDE 40 MG: 10 INJECTION, SOLUTION INTRAMUSCULAR; INTRAVENOUS at 11:19

## 2023-04-21 RX ADMIN — ATORVASTATIN CALCIUM 40 MG: 40 TABLET, FILM COATED ORAL at 09:08

## 2023-04-21 RX ADMIN — FUROSEMIDE 40 MG: 10 INJECTION, SOLUTION INTRAMUSCULAR; INTRAVENOUS at 23:33

## 2023-04-21 RX ADMIN — DOXYCYCLINE 100 MG: 100 INJECTION, POWDER, LYOPHILIZED, FOR SOLUTION INTRAVENOUS at 09:08

## 2023-04-21 RX ADMIN — CARVEDILOL 25 MG: 12.5 TABLET, FILM COATED ORAL at 17:16

## 2023-04-21 RX ADMIN — NITROGLYCERIN 115 MCG/MIN: 20 INJECTION INTRAVENOUS at 09:09

## 2023-04-21 NOTE — CASE MANAGEMENT/SOCIAL WORK
Discharge Planning Assessment  [facility]     Patient Name: Odell Anne  MRN: [id number]  Today's Date: 4/21/2023    Admit Date: 4/20/2023    Plan: Home   Discharge Needs Assessment     Row Name 04/21/23 0931       Living Environment    People in Home spouse    Current Living Arrangements home    Primary Care Provided by self    Provides Primary Care For no one    Family Caregiver if Needed spouse    Quality of Family Relationships unable to assess    Able to Return to Prior Arrangements yes       Resource/Environmental Concerns    Resource/Environmental Concerns none       Transition Planning    Patient/Family Anticipates Transition to home with family    Patient/Family Anticipated Services at Transition     Transportation Anticipated family or friend will provide       Discharge Needs Assessment    Readmission Within the Last 30 Days no previous admission in last 30 days    Equipment Currently Used at Home none    Concerns to be Addressed discharge planning    Anticipated Changes Related to Illness none    Equipment Needed After Discharge none               Discharge Plan     Row Name 04/21/23 0931       Plan    Plan Home    Patient/Family in Agreement with Plan yes    Plan Comments Spoke with patient's wife by phone to initiate discharge planning.  Patient lives with her in [place].  He is independent with ADL's.  He has no DME at home and is not current with home health.  His PCP is Dennis Colón.  He does not have an advanced directive.  Mr. Anne has RX coverage and has his scripts filled at [facility].  His plan is to return home at discharge.  No needs noted at this time.  CM will continue to follow.    Final Discharge Disposition Code 01 - home or self-care              Continued Care and Services - Admitted Since 4/20/2023    Coordination has not been started for this encounter.       Expected Discharge Date and Time     Expected Discharge Date Expected Discharge Time    Apr 24, 2023           Demographic Summary     Row Name 04/21/23 0930       General Information    Admission Type inpatient    Arrived From emergency department    Referral Source admission list    Reason for Consult discharge planning    Preferred Language English               Functional Status     Row Name 04/21/23 0931       Functional Status    Usual Activity Tolerance moderate    Current Activity Tolerance moderate       Functional Status, IADL    Medications independent    Meal Preparation independent    Housekeeping independent    Laundry independent    Shopping independent               Psychosocial    No documentation.                Abuse/Neglect    No documentation.                Legal    No documentation.                Substance Abuse    No documentation.                Patient Forms    No documentation.                   Lilliana Peralta RN

## 2023-04-21 NOTE — CONSULTS
[facility] at [facility]   Consult Note      Reason for Consultation: a fib       Problem list:  1. A fib. Paroxysmal    1. Dx post CABG -  2. CHADS-VASC 4 - on Eliquis  2. CHF - systolic  1. ECHO 2019 EF 40-45%, last ECHO normalized - unknown date  3. MV CAD -  1. NSTEMI 10/2019 - cath showed multivessel CAD at [facility]  2. s/p CABG 10/2019 x 6 dheeraj LIMA to mLAD, SVG to diagonal, sequential SVG to ramus intermediate and OM, sequential SVG to dRCA and PLVVB  4. HTN  5. Hyperlipidemia  6. Tobacco Abuse/COPD  7. Vocal cord cancer- 2014    History of present illness:      74 y/o male that presented to ER today with shortness of air and dizziness.  He states that he believed he was in A-fib.  He was in town from [place] to do a conference.  He states while he was given the speech she felt very lightheaded and short of air.  He states at that time he thought he needed to go the emergency room and presented to our emergency room where he was with A-fib with RVR and he was hypoxic and hypertensive and he was cardioverted in the emergency room.  He went to a normal sinus rhythm and he states that after the cardioversion he felt better.  He states that he has been feeling bad for approximately 2 weeks and contacted his primary care who was concerned that he may be in A-fib but had no work-up at that time and they treated him for COPD flare..  He states that he takes Eliquis since initial diagnosis.  He has tested positive for parainfluenza.    Patient Care Team:  Dennis Colón MD as PCP - General (Internal Medicine)    Past Medical History:   Diagnosis Date   • Cancer     throat- radiation only   • Coronary artery disease    • GERD (gastroesophageal reflux disease)    • Myocardial infarction    • Status post coronary artery bypass graft        Past Surgical History:   Procedure Laterality Date   • BYPASS GRAFT           No Known Allergies        Current Facility-Administered Medications:   •   acetaminophen (TYLENOL) tablet 650 mg, 650 mg, Oral, Q4H PRN, Justin Baldwin III, DO, 650 mg at 04/21/23 0153  •  apixaban (ELIQUIS) tablet 5 mg, 5 mg, Oral, Q12H, Tarah Tinsley, PA-C, 5 mg at 04/21/23 0908  •  aspirin chewable tablet 81 mg, 81 mg, Oral, Daily, Tarah Tinsley, PA-C, 81 mg at 04/21/23 0908  •  atorvastatin (LIPITOR) tablet 40 mg, 40 mg, Oral, Daily, Tarah Tinsley, PA-C, 40 mg at 04/21/23 0908  •  Calcium Replacement - Follow Nurse / BPA Driven Protocol, , Does not apply, PRN, Justin Baldwin III, DO  •  carvedilol (COREG) tablet 12.5 mg, 12.5 mg, Oral, BID With Meals, Tarah Tinsley, PA-C, 12.5 mg at 04/21/23 0908  •  cefTRIAXone (ROCEPHIN) 1 g/100 mL 0.9% NS (MBP), 1 g, Intravenous, Q24H, Justin Baldwin III, DO, 1 g at 04/20/23 2310  •  doxycycline (VIBRAMYCIN) 100 mg in sodium chloride 0.9 % 250 mL IVPB-VTB, 100 mg, Intravenous, Q12H, Tarah Tinsley, PA-C, 100 mg at 04/21/23 0908  •  HYDROcodone-acetaminophen (NORCO) 5-325 MG per tablet 1 tablet, 1 tablet, Oral, Q4H PRN, Justin Baldwin III, DO  •  Magnesium Standard Dose Replacement - Follow Nurse / BPA Driven Protocol, , Does not apply, PRN, Justin Baldwin III, DO  •  nitroglycerin (TRIDIL) 200 mcg/ml infusion, 5-200 mcg/min, Intravenous, Titrated, Simon Mccormack, DO, Last Rate: 30 mL/hr at 04/21/23 1004, 100 mcg/min at 04/21/23 1004  •  ondansetron (ZOFRAN) injection 4 mg, 4 mg, Intravenous, Q6H PRN, Justin Baldwin III, DO  •  pantoprazole (PROTONIX) EC tablet 40 mg, 40 mg, Oral, Q AM, Tarah Tinsley PA-C  •  Pharmacy Consult - MTM, , Does not apply, Daily, Phillip Forrester, [facility]  •  Phosphorus Replacement - Follow Nurse / BPA Driven Protocol, , Does not apply, PRN, Justin Baldwin III, DO  •  Potassium Replacement - Follow Nurse / BPA Driven Protocol, , Does not apply, PRN, Justin Baldwin III, DO  •  Propofol (DIPRIVAN) injection 20 mg, 20 mg, Intravenous,  Once, Simon Mccormack, DO  •  sodium chloride 0.9 % flush 10 mL, 10 mL, Intravenous, PRN, Simon Mccormack, DO  •  sodium chloride 0.9 % flush 10 mL, 10 mL, Intravenous, Q12H, Justin Baldwin III, DO, 10 mL at 04/21/23 0909  •  sodium chloride 0.9 % flush 10 mL, 10 mL, Intravenous, PRN, Justin Baldwin III, DO  •  sodium chloride 0.9 % infusion 40 mL, 40 mL, Intravenous, PRN, Justin Baldwin III, DO    nitroglycerin, 5-200 mcg/min, Last Rate: 100 mcg/min (04/21/23 1004)        Medications Prior to Admission   Medication Sig Dispense Refill Last Dose   • apixaban (ELIQUIS) 5 MG tablet tablet Take 1 tablet by mouth 2 (Two) Times a Day.   4/20/2023   • aspirin 81 MG chewable tablet Chew 1 tablet Daily.   4/20/2023   • atorvastatin (LIPITOR) 40 MG tablet Take 1 tablet by mouth Daily.   4/20/2023   • carvedilol (COREG) 12.5 MG tablet Take 1 tablet by mouth 2 (Two) Times a Day With Meals.   4/20/2023   • tamsulosin (FLOMAX) 0.4 MG capsule 24 hr capsule Take 1 capsule by mouth Every Night.   4/20/2023   • amiodarone (PACERONE) 200 MG tablet Take  by mouth 3 (Three) Times a Day.      • esomeprazole (nexIUM) 20 MG capsule Take 10 mg by mouth Every Morning Before Breakfast.      • furosemide (LASIX) 40 MG tablet Take 1 tablet by mouth Daily. 30 tablet 2    • lisinopril (PRINIVIL,ZESTRIL) 2.5 MG tablet Take 1 tablet by mouth Daily. 30 tablet 1    • traMADol (ULTRAM) 50 MG tablet Take 50 mg by mouth Every 6 (Six) Hours As Needed for Moderate Pain .            Subjective .         Social History     Socioeconomic History   • Marital status:    Tobacco Use   • Smoking status: Former   • Smokeless tobacco: Never   Vaping Use   • Vaping Use: Never used   Substance and Sexual Activity   • Alcohol use: No   • Drug use: Defer   • Sexual activity: Defer     Family History   Family history unknown: Yes         Review of Systems:  ROS  Pertinent positives noted in history, exam, and assessment. Otherwise reviewed  "and negative.       Objective   Vitals:  /84   Pulse 101   Temp 96.6 °F (35.9 °C) (Oral)   Resp 16   Ht 188 cm (74\")   Wt 105 kg (230 lb 6.4 oz)   SpO2 92%   BMI 29.58 kg/m²      Intake/Output Summary (Last 24 hours) at 4/21/2023 1103  Last data filed at 4/21/2023 0300  Gross per 24 hour   Intake --   Output 1925 ml   Net -1925 ml       Vitals reviewed.   Eyes:      Conjunctiva/sclera: Conjunctivae normal.      Pupils: Pupils are equal, round, and reactive to light.   Pulmonary:      Effort: Pulmonary effort is normal.      Breath sounds: Wheezing present.   Cardiovascular:      Normal rate. Regular rhythm.   Edema:     Peripheral edema present.     Pretibial: bilateral 1+ edema of the pretibial area.     Ankle: bilateral 2+ pitting edema of the ankle.     Feet: bilateral edema of the feet.  Skin:     General: Skin is warm and dry.   Neurological:      Mental Status: Alert and oriented to person, place and time.                 Results Review:  I reviewed the patient's new clinical results.  Results from last 7 days   Lab Units 04/21/23  0513   WBC 10*3/mm3 8.91   HEMOGLOBIN g/dL 13.8   HEMATOCRIT % 43.8   PLATELETS 10*3/mm3 325     Results from last 7 days   Lab Units 04/21/23  0513   SODIUM mmol/L 147*   POTASSIUM mmol/L 4.3   CHLORIDE mmol/L 106   CO2 mmol/L 27.0   BUN mg/dL 18   CREATININE mg/dL 1.43*   CALCIUM mg/dL 9.1   BILIRUBIN mg/dL 0.6   ALK PHOS U/L 71   ALT (SGPT) U/L 17   AST (SGOT) U/L 13   GLUCOSE mg/dL 130*     Results from last 7 days   Lab Units 04/21/23  0513   SODIUM mmol/L 147*   POTASSIUM mmol/L 4.3   CHLORIDE mmol/L 106   CO2 mmol/L 27.0   BUN mg/dL 18   CREATININE mg/dL 1.43*   GLUCOSE mg/dL 130*   CALCIUM mg/dL 9.1     Results from last 7 days   Lab Units 04/20/23 2145 04/20/23  1621   INR  1.30* 1.34*     Lab Results   Lab Value Date/Time    TROPONINT 33 (H) 04/20/2023 2144    TROPONINT 39 (H) 04/20/2023 1921    TROPONINT 33 (H) 04/20/2023 1621    TROPONINT 0.141 (C) " 10/26/2019 1801    TROPONINT 0.133 (C) 10/26/2019 1101    TROPONINT 0.140 (C) 10/26/2019 0708    TROPONINT 0.150 (C) 10/26/2019 0442     Results from last 7 days   Lab Units 04/21/23  0513 04/20/23  1921   TSH uIU/mL  --  10.040*   FREE T4 ng/dL 1.31  --          Results from last 7 days   Lab Units 04/20/23  1621   PROBNP pg/mL 2,781.0*     CHEST Xray  Bibasilar pneumonia with suspected small left-sided pleural effusion. Follow-up to resolution recommended.      Tele:  Normal sinus with PVC and PAC    EKG:       Acute respiratory failure with hypoxia    Acute congestive heart failure    GERD (gastroesophageal reflux disease)    Coronary artery disease involving native coronary artery of native heart    COPD (chronic obstructive pulmonary disease)    PAF (paroxysmal atrial fibrillation)    Vocal cord cancer    BPH with elevated PSA    BRYCE (acute kidney injury)    Pneumonia of both lungs due to infectious organism    Atrial fibrillation with RVR    Acute pulmonary edema    Parainfluenza        Assessment & Plan     1. A fib  2. CHF  3. COPD  4. BRYCE with CKD  5. Parainfluenza/pnuemonia  6. CAD      Plan:    1. Continue Eliquis  2. ECHO ordered - will review  3. Recommend another Lasix 40mg IV bid today with am labs  4. Strict I&O, daily weight  5. D/c NTG drip  6. Continue ASA, STATIN  7. Increase carvedilol to 25mg bid   8. Hopefully discharge tomorrow with 3 days of Lasix 40mg daily   9. F/u with PMD in 1 week            Electronically signed by Amarilis Chand PA-C, 04/21/23, 9:52 AM EDT.    Dr Ramírez personally evaluated and examined the patient and agree with the assessment, treatment plan, and disposition of the patient as recorded by the physician assistant.           Please note that portions of this note were dictated utilizing Dragon dictation.

## 2023-04-21 NOTE — PLAN OF CARE
Goal Outcome Evaluation:  Plan of Care Reviewed With: patient        Progress: improving   Pt currently on RA and NSR with pvc's on tele. Patient complained of leg and hand cramps which were relieved with prn norco. Good urinary output. No acute events this shift. Will cont to monitor.    Patient back on 2l nc. He was unable to sustain O2 above 88% on RA.

## 2023-04-21 NOTE — H&P
[facility] Medicine Services  HISTORY AND PHYSICAL    Patient Name: Odell Anne  : 1950  MRN: [id number]  Primary Care Physician: Dennis Colón MD  Date of admission: 2023    Subjective   Subjective     Chief Complaint:  SOB    HPI:  Odell Anne is a 73 y.o. male with a past medical history significant for chronic diastolic CHF, CAD s/p CABG, PAF anticoagulated on Eliquis, hypertension, HLD, COPD, GERD, BPH, and vocal cord cancer s/p radiation. He presents today with complaints of progressively worsening SOB going on for the past 2-3 days. Dyspnea worse with exertion. There is associated dry cough, generalized weakness, and increase in BLE edema from baseline. He is in town today from [place] for a speaking engagement wherein he experienced profound dyspnea with palpitations. States he was unable to complete his speech. Was concerned he was in Afib. Subsequently presented to ED for further evaluation and treatment. Typically receives care at  in [place].  Currently there are no complaints of fever, chest pain, or syncope. No abdominal pain or N/v/D. No headache or focal weakness/parathesias. No dysuria or flank pain. Will admit to inpatient.      Review of Systems   Constitutional: Positive for fatigue. Negative for chills and fever.   HENT: Negative for congestion and trouble swallowing.    Eyes: Negative for photophobia and visual disturbance.   Respiratory: Positive for cough and shortness of breath.    Cardiovascular: Positive for leg swelling. Negative for chest pain.   Gastrointestinal: Negative for abdominal pain, diarrhea, nausea and vomiting.   Endocrine: Negative for cold intolerance and heat intolerance.   Genitourinary: Negative for dysuria and flank pain.   Musculoskeletal: Negative for back pain and gait problem.   Skin: Negative for pallor and rash.   Allergic/Immunologic: Negative for immunocompromised state.    Neurological: Negative for dizziness, weakness and headaches.   Hematological: Negative for adenopathy.   Psychiatric/Behavioral: Negative for agitation and confusion.            Personal History     Past Medical History:   Diagnosis Date   • Cancer     throat- radiation only   • Coronary artery disease    • GERD (gastroesophageal reflux disease)    • Myocardial infarction    • Status post coronary artery bypass graft          Oncology Problem List:  Vocal cord cancer (01/01/2014; Status: Active)       Past Surgical History:   Procedure Laterality Date   • BYPASS GRAFT         Family History:  Family history is unknown by patient.     Social History:  reports that he has quit smoking. He has never used smokeless tobacco. Drug use questions deferred to the physician. He reports that he does not drink alcohol.  Social History     Social History Narrative   • Not on file       Medications:  amiodarone, aspirin, atorvastatin, carvedilol, esomeprazole, furosemide, lisinopril, traMADol, and warfarin    No Known Allergies    Objective   Objective     Vital Signs:   Temp:  [98 °F (36.7 °C)] 98 °F (36.7 °C)  Heart Rate:  [] 108  Resp:  [22] 22  BP: (163-183)/() 163/115    Physical Exam   Constitutional: Awake, alert  Eyes: PERRLA, sclerae anicteric, no conjunctival injection  HENT: NCAT, mucous membranes moist  Neck: Supple, no thyromegaly, no lymphadenopathy, trachea midline  Respiratory: Clear to auscultation bilaterally, nonlabored respirations   Cardiovascular: RRR, no murmurs, rubs, or gallops, palpable pedal pulses bilaterally  Gastrointestinal: Positive bowel sounds, soft, nontender, nondistended  Musculoskeletal: BLE edema +2, Pitting no clubbing or cyanosis to extremities  Psychiatric: Appropriate affect, cooperative  Neurologic: Oriented x 3, strength symmetric in all extremities, Cranial Nerves grossly intact to confrontation, speech clear  Skin: No rashes      Result Review:  I have personally  reviewed the results from the time of this admission to 4/20/2023 21:43 EDT and agree with these findings:  [x]  Laboratory list / accordion  []  Microbiology  []  Radiology  []  EKG/Telemetry   []  Cardiology/Vascular   []  Pathology  [x]  Old records  []  Other:  Most notable findings include: hypoxic to 90% on room air. /109. . Vitals otherwise stable. Troponin 33. BNP 2700. Glucose 123. Creatinine 1.35. chemistry and hematology otherwise favorable.    LAB RESULTS:      Lab 04/20/23  1921 04/20/23  1621   WBC  --  6.96   HEMOGLOBIN  --  14.5   HEMATOCRIT  --  47.2   PLATELETS  --  356   NEUTROS ABS  --  4.91   IMMATURE GRANS (ABS)  --  0.02   LYMPHS ABS  --  1.20   MONOS ABS  --  0.56   EOS ABS  --  0.22   MCV  --  87.6   PROCALCITONIN 0.04  --    PROTIME  --  16.5*   D DIMER QUANT  --  0.56         Lab 04/20/23  1621   SODIUM 137   POTASSIUM 4.1   CHLORIDE 101   CO2 24.0   ANION GAP 12.0   BUN 15   CREATININE 1.35*   EGFR 55.1*   GLUCOSE 123*   CALCIUM 9.2         Lab 04/20/23  1621   TOTAL PROTEIN 7.6   ALBUMIN 4.2   GLOBULIN 3.4   ALT (SGPT) 21   AST (SGOT) 16   BILIRUBIN 0.6   ALK PHOS 73         Lab 04/20/23  1921 04/20/23  1621   PROBNP  --  2,781.0*   HSTROP T 39* 33*   PROTIME  --  16.5*   INR  --  1.34*                 Brief Urine Lab Results     None        Microbiology Results (last 10 days)     ** No results found for the last 240 hours. **          XR Chest 1 View    Result Date: 4/20/2023  XR CHEST 1 VW Date of Exam: 4/20/2023 6:04 PM EDT Indication: CHF/COPD protocol. Comparison: 10/26/2019 Findings: Patchy airspace disease is seen within the lower lobes.. There is a small left-sided pleural effusion. Large pleural bleb is seen within the right upper lobe region.. No pneumothorax. The pulmonary vasculature appears mildly indistinct. Chronic interstitial changes are present. The cardiac and mediastinal silhouette appear unremarkable. No acute osseous abnormality identified.      Impression: Impression: Bibasilar pneumonia with suspected small left-sided pleural effusion. Follow-up to resolution recommended. Electronically Signed: Gaby Richard  4/20/2023 6:36 PM EDT  Workstation ID: [id number]          Assessment & Plan   Assessment & Plan       Acute respiratory failure with hypoxia    Acute congestive heart failure    Elevated troponin    BRYCE (acute kidney injury)    Pneumonia of both lungs due to infectious organism    Atrial fibrillation with RVR    CAD (coronary artery disease)    COPD (chronic obstructive pulmonary disease)    PAF (paroxysmal atrial fibrillation)    GERD (gastroesophageal reflux disease)    Vocal cord cancer    BPH with elevated PSA      72 yo male with extensive cardiac history here with atrial fibrillation with RVR, pulmonary edema, and pneumonia. Cardioverted in ED. To see cards.    Atrial Fibrillation with RVR  PAF Anticoagulated on Eliquis  - successfully cardioverted in ED. Now hemodynamically stable. Symptoms improved  - continue Eliquis  - trend troponin  - check TSH, magnesium  - echocardiogram  - consult to cards  - close monitor on telemetry  - am labs    Acute Respiratory Failure with Hypoxia  COPD, former smoker, not on supplemental oxygen  - multifactorial to include acute heart failure and possible pneumonia  - administered 80 mg lasix in ED. Currently on NTG gtt for BP control and load reduction. Continue  - was on 40 mg lasix daily but was taken off 3 years ago  - continue with echo  - daily weights  - strict I&O  - started rocephin and doxycycline. Continue  - blood cultures, COVID PCR, sputum culture, urine antigens, MRSA PCR  - procalcitonin, lactic acid  - duo nebs with additional pulmonary toilet as needed  - maintain oxygen saturations > 92%    BRYCE  - ? Cardiorenal  - hold nephrotoxins  - check UA  - strict I&O    CAD s/p CABG  HTN  HLD  - continue ASA, statin, Coreg  - holding lisinopril per BRYCE    BPH  - continue flomax    GERD  - PPI    DVT  "prophylaxis:  Eliquis    CODE STATUS:  Full code  Level Of Support Discussed With: Patient  Code Status (Patient has no pulse and is not breathing): CPR (Attempt to Resuscitate)  Medical Interventions (Patient has pulse or is breathing): Full Support      Expected Discharge  TBD    This note has been completed as part of a split-shared workflow.     Electronically signed by Tarah Tinsley PA-C, 04/20/23, 10:15 PM EDT.    Total time spent: 90  Time spent includes time reviewing chart, face-to-face time, counseling patient/family/caregiver, ordering medications/tests/procedures, communicating with other health care professionals, documenting clinical information in the electronic health record, and coordination of care.      Attending   Admission Attestation       I have performed an independent face-to-face diagnostic evaluation including performing an independent physical examination as documented here.  The documented plan of care above was reviewed and developed with the advanced practice clinician (APC).      Brief Summary Statement:   Odell Anne is a 73 y.o. male who states that for the last 3 days he has noticed worsening shortness of breath.  He states it is worse with exertion.  He also confirms sensation of wheezing, as well as cough which is nonproductive.  He also states he has noticed worsening generalized weakness as his dyspnea has worsened.  He confirms he has noticed increased bilateral distal lower extremity swelling for the last 2 days.  He was set to speak at a conference earlier today (Thursday) but due to the dyspnea and eventual palpitations, he had to cancel this engagement and instead came to the ED for further evaluation.  He confirms he has history of atrial fibrillation but notes that he has not had a recurrence \"in a really long time.\"    Remainder of detailed HPI is as noted by APC and has been reviewed and/or edited by me for completeness.    Attending Physical Exam:  Temp:  [98 " "°F (36.7 °C)] 98 °F (36.7 °C)  Heart Rate:  [] 91  Resp:  [16-22] 16  BP: (163-191)/() 163/101    Constitutional: Awake, alert, NAD, pleasant.  Eyes: PERRLA, sclerae anicteric, no conjunctival injection  HENT: NCAT, mucous membranes moist  Neck: Supple, no thyromegaly, no lymphadenopathy, trachea midline  Respiratory: \"Distant\" sounds but clear to auscultation bilaterally, nonlabored respirations   Cardiovascular: Irregularly irregular but rate controlled, no murmurs, rubs, or gallops, palpable pedal pulses bilaterally  Gastrointestinal: Positive bowel sounds, soft, nontender, nondistended  Musculoskeletal: 2+ bilateral distal lower extremity edema, no clubbing or cyanosis to extremities  Psychiatric: Appropriate affect, cooperative  Neurologic: Oriented x 3, strength symmetric in all extremities, Cranial Nerves grossly intact to confrontation, speech clear  Skin: No rashes, normal turgor.    Brief Assessment/Plan :  See detailed assessment and plan developed with APC which I have reviewed and/or edited for completeness.    Total time spent: 18 minutes  Time spent includes time reviewing chart, face-to-face time, counseling patient/family/caregiver, ordering medications/tests/procedures, communicating with other health care professionals, documenting clinical information in the electronic health record, and coordination of care.        Justin Baldwin III, DO  04/20/23                        "

## 2023-04-21 NOTE — PROGRESS NOTES
[facility] Medicine Services  PROGRESS NOTE    Patient Name: Odell Anne  : 1950  MRN: [id number]    Date of Admission: 2023  Primary Care Physician: Dennis Colón MD    Subjective   Subjective     CC:  dyspnea    HPI:  Feels well - wean off oxygen in room and still feels well  Leg cramps overnight and asks for assistance, associated w diuretics  Some swelling in legs but not significant    Objective   Objective     Vital Signs:   Temp:  [96.5 °F (35.8 °C)-96.6 °F (35.9 °C)] 96.6 °F (35.9 °C)  Heart Rate:  [] 85  Resp:  [16-20] 16  BP: (104-191)/() 136/77  Flow (L/min):  [2-3] 2     Physical Exam:  Constitutional: No acute distress, awake, alert  HENT: NCAT, mucous membranes moist  Respiratory: Clear to auscultation bilaterally, respiratory effort normal on room air  Cardiovascular: RRR, no murmurs, rubs, or gallops. +1+ edema distal legs bilaterally  Gastrointestinal: Soft, nontender, nondistended  Musculoskeletal: Muscle tone within normal limits, no joint effusions appreciated  Psychiatric: Appropriate affect, cooperative  Neurologic: Alert and oriented, facial movements symmetric and spontaneous movement of all 4 extremities grossly equal bilaterally, speech clear  Skin: No rashes    Results Reviewed:  LAB RESULTS:      Lab 23  0513 23  2145 23  2144 23  1921 23  1621   WBC 8.91  --   --   --  6.96   HEMOGLOBIN 13.8  --   --   --  14.5   HEMATOCRIT 43.8  --   --   --  47.2   PLATELETS 325  --   --   --  356   NEUTROS ABS 6.11  --   --   --  4.91   IMMATURE GRANS (ABS) 0.05  --   --   --  0.02   LYMPHS ABS 1.26  --   --   --  1.20   MONOS ABS 1.14*  --   --   --  0.56   EOS ABS 0.28  --   --   --  0.22   MCV 87.6  --   --   --  87.6   PROCALCITONIN  --   --   --  0.04  --    LACTATE  --   --  1.9  --   --    PROTIME  --  16.1*  --   --  16.5*   D DIMER QUANT  --   --   --   --  0.56         Lab 23  0513  04/20/23  1921 04/20/23  1621   SODIUM 147*  --  137   POTASSIUM 4.3  --  4.1   CHLORIDE 106  --  101   CO2 27.0  --  24.0   ANION GAP 14.0  --  12.0   BUN 18  --  15   CREATININE 1.43*  --  1.35*   EGFR 51.4*  --  55.1*   GLUCOSE 130*  --  123*   CALCIUM 9.1  --  9.2   MAGNESIUM 2.0  --   --    TSH  --  10.040*  --          Lab 04/21/23  0513 04/20/23  1621   TOTAL PROTEIN 6.7 7.6   ALBUMIN 3.9 4.2   GLOBULIN 2.8 3.4   ALT (SGPT) 17 21   AST (SGOT) 13 16   BILIRUBIN 0.6 0.6   ALK PHOS 71 73         Lab 04/20/23  2145 04/20/23 2144 04/20/23 1921 04/20/23  1621   PROBNP  --   --   --  2,781.0*   HSTROP T  --  33* 39* 33*   PROTIME 16.1*  --   --  16.5*   INR 1.30*  --   --  1.34*                 Brief Urine Lab Results     None          Microbiology Results Abnormal     None          XR Chest 1 View    Result Date: 4/20/2023  XR CHEST 1 VW Date of Exam: 4/20/2023 6:04 PM EDT Indication: CHF/COPD protocol. Comparison: 10/26/2019 Findings: Patchy airspace disease is seen within the lower lobes.. There is a small left-sided pleural effusion. Large pleural bleb is seen within the right upper lobe region.. No pneumothorax. The pulmonary vasculature appears mildly indistinct. Chronic interstitial changes are present. The cardiac and mediastinal silhouette appear unremarkable. No acute osseous abnormality identified.     Impression: Impression: Bibasilar pneumonia with suspected small left-sided pleural effusion. Follow-up to resolution recommended. Electronically Signed: Gaby Richard  4/20/2023 6:36 PM EDT  Workstation ID: [id number]          Current medications:  Scheduled Meds:apixaban, 5 mg, Oral, Q12H  aspirin, 81 mg, Oral, Daily  atorvastatin, 40 mg, Oral, Daily  carvedilol, 25 mg, Oral, BID With Meals  cefTRIAXone, 1 g, Intravenous, Q24H  doxycycline, 100 mg, Intravenous, Q12H  furosemide, 40 mg, Intravenous, Q12H  magnesium oxide, 400 mg, Oral, Daily  pantoprazole, 40 mg, Oral, Q AM  pharmacy consult - MTM, , Does  not apply, Daily  Propofol, 20 mg, Intravenous, Once  sodium chloride, 10 mL, Intravenous, Q12H      Continuous Infusions:   PRN Meds:.•  acetaminophen  •  Calcium Replacement - Follow Nurse / BPA Driven Protocol  •  HYDROcodone-acetaminophen  •  Magnesium Standard Dose Replacement - Follow Nurse / BPA Driven Protocol  •  ondansetron  •  Phosphorus Replacement - Follow Nurse / BPA Driven Protocol  •  Potassium Replacement - Follow Nurse / BPA Driven Protocol  •  sodium chloride  •  sodium chloride  •  sodium chloride    Assessment & Plan   Assessment & Plan     Active Hospital Problems    Diagnosis  POA   • **Acute respiratory failure with hypoxia [J96.01]  Yes   • Acute pulmonary edema [J81.0]  Yes   • Parainfluenza [B34.8]  Yes   • COPD (chronic obstructive pulmonary disease) [J44.9]  Yes   • PAF (paroxysmal atrial fibrillation) [I48.0]  Yes   • BRYCE (acute kidney injury) [N17.9]  Yes   • Pneumonia of both lungs due to infectious organism [J18.9]  Yes   • Atrial fibrillation with RVR [I48.91]  Yes   • BPH with elevated PSA [N40.0, R97.20]  Yes   • GERD (gastroesophageal reflux disease) [K21.9]  Yes   • Acute congestive heart failure [I50.9]  Yes   • Coronary artery disease involving native coronary artery of native heart [I25.10]  Yes   • Vocal cord cancer [C32.0]  Yes      Resolved Hospital Problems   No resolved problems to display.        Brief Hospital Course to date:  Odell Anne is a 73 y.o. male w paroxysmal atrial fibrillation who presents with dyspnea. Found to be in unstable Afib, s/p cardioversion in ED. Some pulm edema, borderline on O2 currently. Incidentally found to be parainfluenza++    Paroxysmal Afib c/b Afib w RVR requiring cardioversion  -eliquis, coreg increase per cardiology  -ECHO pending  -TSH elevated but free T4 normal, not contributor    CHF (data deficit) w acute exacerbation, likely triggered by Afib w RVR  -lasix per cards, ECHO pending  -not acute hypoxic resp failure (requring 1  liter n/c)    Parainfluenza +, bibasilar infiltrates c/w PNA   -on rocephin + doxy now out of abundance of caution    CKDIIIa at baseline, not BRYCE -restart lisinopril soon  COPD, former smoker - no home O2  CAD s/p CABG - ASA, statin, coreg  BPH - flomax  GERD - ppi    Expected Discharge Location and Transportation: home  Expected Discharge 4/22 per cards (BMP + Mg pending)  Expected Discharge Date and Time     Expected Discharge Date Expected Discharge Time    Apr 22, 2023            DVT prophylaxis:  Medical DVT prophylaxis orders are present.          CODE STATUS:   Code Status and Medical Interventions:   Ordered at: 04/20/23 2125     Level Of Support Discussed With:    Patient     Code Status (Patient has no pulse and is not breathing):    CPR (Attempt to Resuscitate)     Medical Interventions (Patient has pulse or is breathing):    Full Support       Sharri Kingsley MD  04/21/23

## 2023-04-22 ENCOUNTER — READMISSION MANAGEMENT (OUTPATIENT)
Dept: CALL CENTER | Facility: HOSPITAL | Age: 73
End: 2023-04-22
Payer: MEDICARE

## 2023-04-22 ENCOUNTER — APPOINTMENT (OUTPATIENT)
Dept: CARDIOLOGY | Facility: HOSPITAL | Age: 73
DRG: 291 | End: 2023-04-22
Payer: MEDICARE

## 2023-04-22 VITALS
RESPIRATION RATE: 16 BRPM | OXYGEN SATURATION: 91 % | TEMPERATURE: 96.6 F | SYSTOLIC BLOOD PRESSURE: 123 MMHG | WEIGHT: 231.48 LBS | DIASTOLIC BLOOD PRESSURE: 70 MMHG | BODY MASS INDEX: 29.71 KG/M2 | HEART RATE: 76 BPM | HEIGHT: 74 IN

## 2023-04-22 LAB
ANION GAP SERPL CALCULATED.3IONS-SCNC: 12 MMOL/L (ref 5–15)
BH CV ECHO MEAS - AI P1/2T: 392.8 MSEC
BH CV ECHO MEAS - AO MAX PG: 5.1 MMHG
BH CV ECHO MEAS - AO MEAN PG: 3 MMHG
BH CV ECHO MEAS - AO ROOT DIAM: 3.6 CM
BH CV ECHO MEAS - AO V2 MAX: 113 CM/SEC
BH CV ECHO MEAS - AO V2 VTI: 21.9 CM
BH CV ECHO MEAS - AVA(I,D): 1.91 CM2
BH CV ECHO MEAS - EDV(CUBED): 125 ML
BH CV ECHO MEAS - EDV(MOD-SP2): 82.4 ML
BH CV ECHO MEAS - EDV(MOD-SP4): 85.6 ML
BH CV ECHO MEAS - EF(MOD-BP): 57 %
BH CV ECHO MEAS - EF(MOD-SP2): 59.3 %
BH CV ECHO MEAS - EF(MOD-SP4): 56.2 %
BH CV ECHO MEAS - ESV(CUBED): 64 ML
BH CV ECHO MEAS - ESV(MOD-SP2): 33.5 ML
BH CV ECHO MEAS - ESV(MOD-SP4): 37.5 ML
BH CV ECHO MEAS - FS: 20 %
BH CV ECHO MEAS - IVS/LVPW: 1 CM
BH CV ECHO MEAS - IVSD: 1.4 CM
BH CV ECHO MEAS - LA DIMENSION: 4.1 CM
BH CV ECHO MEAS - LAT PEAK E' VEL: 13.5 CM/SEC
BH CV ECHO MEAS - LV MASS(C)D: 291.4 GRAMS
BH CV ECHO MEAS - LV MAX PG: 2.6 MMHG
BH CV ECHO MEAS - LV MEAN PG: 1 MMHG
BH CV ECHO MEAS - LV V1 MAX: 81.2 CM/SEC
BH CV ECHO MEAS - LV V1 VTI: 13.3 CM
BH CV ECHO MEAS - LVIDD: 5 CM
BH CV ECHO MEAS - LVIDS: 4 CM
BH CV ECHO MEAS - LVOT AREA: 3.1 CM2
BH CV ECHO MEAS - LVOT DIAM: 2 CM
BH CV ECHO MEAS - LVPWD: 1.4 CM
BH CV ECHO MEAS - MED PEAK E' VEL: 8.3 CM/SEC
BH CV ECHO MEAS - MV A MAX VEL: 70.2 CM/SEC
BH CV ECHO MEAS - MV DEC SLOPE: 412 CM/SEC2
BH CV ECHO MEAS - MV DEC TIME: 0.18 MSEC
BH CV ECHO MEAS - MV E MAX VEL: 72.8 CM/SEC
BH CV ECHO MEAS - MV E/A: 1.04
BH CV ECHO MEAS - MV MAX PG: 2.9 MMHG
BH CV ECHO MEAS - MV MEAN PG: 2 MMHG
BH CV ECHO MEAS - MV P1/2T: 65.8 MSEC
BH CV ECHO MEAS - MV V2 VTI: 24.1 CM
BH CV ECHO MEAS - MVA(P1/2T): 3.3 CM2
BH CV ECHO MEAS - MVA(VTI): 1.73 CM2
BH CV ECHO MEAS - PA ACC TIME: 0.07 SEC
BH CV ECHO MEAS - PA PR(ACCEL): 45.7 MMHG
BH CV ECHO MEAS - PI END-D VEL: 125 CM/SEC
BH CV ECHO MEAS - RAP SYSTOLE: 3 MMHG
BH CV ECHO MEAS - RVSP: 22 MMHG
BH CV ECHO MEAS - SV(LVOT): 41.8 ML
BH CV ECHO MEAS - SV(MOD-SP2): 48.9 ML
BH CV ECHO MEAS - SV(MOD-SP4): 48.1 ML
BH CV ECHO MEAS - TAPSE (>1.6): 2.14 CM
BH CV ECHO MEAS - TR MAX PG: 18.9 MMHG
BH CV ECHO MEAS - TR MAX VEL: 198.7 CM/SEC
BH CV ECHO MEASUREMENTS AVERAGE E/E' RATIO: 6.68
BH CV VAS BP RIGHT ARM: NORMAL MMHG
BH CV XLRA - RV BASE: 4 CM
BH CV XLRA - RV LENGTH: 8.4 CM
BH CV XLRA - RV MID: 3 CM
BH CV XLRA - TDI S': 7.9 CM/SEC
BUN SERPL-MCNC: 19 MG/DL (ref 8–23)
BUN/CREAT SERPL: 14 (ref 7–25)
CALCIUM SPEC-SCNC: 8.7 MG/DL (ref 8.6–10.5)
CHLORIDE SERPL-SCNC: 101 MMOL/L (ref 98–107)
CO2 SERPL-SCNC: 27 MMOL/L (ref 22–29)
CREAT SERPL-MCNC: 1.36 MG/DL (ref 0.76–1.27)
EGFRCR SERPLBLD CKD-EPI 2021: 54.6 ML/MIN/1.73
GLUCOSE SERPL-MCNC: 116 MG/DL (ref 65–99)
LEFT ATRIUM VOLUME INDEX: 30.3 ML/M2
LV EF 2D ECHO EST: 65 %
MAGNESIUM SERPL-MCNC: 2.1 MG/DL (ref 1.6–2.4)
MAXIMAL PREDICTED HEART RATE: 146 BPM
POTASSIUM SERPL-SCNC: 4.1 MMOL/L (ref 3.5–5.2)
SODIUM SERPL-SCNC: 140 MMOL/L (ref 136–145)
STRESS TARGET HR: 124 BPM

## 2023-04-22 PROCEDURE — 93306 TTE W/DOPPLER COMPLETE: CPT | Performed by: INTERNAL MEDICINE

## 2023-04-22 PROCEDURE — 93306 TTE W/DOPPLER COMPLETE: CPT

## 2023-04-22 PROCEDURE — 99232 SBSQ HOSP IP/OBS MODERATE 35: CPT | Performed by: PHYSICIAN ASSISTANT

## 2023-04-22 PROCEDURE — 83735 ASSAY OF MAGNESIUM: CPT | Performed by: INTERNAL MEDICINE

## 2023-04-22 PROCEDURE — 80048 BASIC METABOLIC PNL TOTAL CA: CPT | Performed by: INTERNAL MEDICINE

## 2023-04-22 RX ORDER — POTASSIUM CHLORIDE 750 MG/1
10 CAPSULE, EXTENDED RELEASE ORAL DAILY
Qty: 30 CAPSULE | Refills: 6 | Status: SHIPPED | OUTPATIENT
Start: 2023-04-22

## 2023-04-22 RX ORDER — FUROSEMIDE 40 MG/1
40 TABLET ORAL DAILY
Qty: 10 TABLET | Refills: 0 | Status: SHIPPED | OUTPATIENT
Start: 2023-04-22

## 2023-04-22 RX ORDER — CARVEDILOL 25 MG/1
25 TABLET ORAL 2 TIMES DAILY WITH MEALS
Qty: 30 TABLET | Refills: 1 | Status: SHIPPED | OUTPATIENT
Start: 2023-04-22

## 2023-04-22 RX ORDER — FUROSEMIDE 40 MG/1
40 TABLET ORAL DAILY
Qty: 30 TABLET | Refills: 11 | Status: SHIPPED | OUTPATIENT
Start: 2023-04-22

## 2023-04-22 RX ADMIN — ATORVASTATIN CALCIUM 40 MG: 40 TABLET, FILM COATED ORAL at 08:58

## 2023-04-22 RX ADMIN — MAGNESIUM OXIDE TAB 400 MG (241.3 MG ELEMENTAL MG) 400 MG: 400 (241.3 MG) TAB at 08:58

## 2023-04-22 RX ADMIN — PANTOPRAZOLE SODIUM 40 MG: 40 TABLET, DELAYED RELEASE ORAL at 05:35

## 2023-04-22 RX ADMIN — CARVEDILOL 25 MG: 12.5 TABLET, FILM COATED ORAL at 08:58

## 2023-04-22 RX ADMIN — ASPIRIN 81 MG 81 MG: 81 TABLET ORAL at 08:58

## 2023-04-22 RX ADMIN — DOXYCYCLINE 100 MG: 100 INJECTION, POWDER, LYOPHILIZED, FOR SOLUTION INTRAVENOUS at 08:58

## 2023-04-22 RX ADMIN — Medication 10 ML: at 08:59

## 2023-04-22 RX ADMIN — APIXABAN 5 MG: 5 TABLET, FILM COATED ORAL at 08:58

## 2023-04-22 NOTE — PROGRESS NOTES
[facility]  Progress Note       LOS: 1 day   Patient Care Team:  Dennis Colón MD as PCP - General (Internal Medicine)    Chief Complaint:  SOB     Subjective     Patient is feeling much improved today and wants to go home. He is sitting up in bed. His O2 sats on RA are 90-93%. He has diuresed well. No CP. No SOB. He remains in NSR.   He has follow up with his PCP on Wednesday and cardiologist in one month.         Review of Systems:   Pertinent positives in HPI, all others reviewed and negative.      Objective       Current Facility-Administered Medications:   •  acetaminophen (TYLENOL) tablet 650 mg, 650 mg, Oral, Q4H PRN, Justin Baldwin III, DO, 650 mg at 04/21/23 0153  •  apixaban (ELIQUIS) tablet 5 mg, 5 mg, Oral, Q12H, Tarah Tinsley PA-C, 5 mg at 04/22/23 0858  •  aspirin chewable tablet 81 mg, 81 mg, Oral, Daily, Tarah Tinsley PA-C, 81 mg at 04/22/23 0858  •  atorvastatin (LIPITOR) tablet 40 mg, 40 mg, Oral, Daily, Tarah Tinsley PA-C, 40 mg at 04/22/23 0858  •  Calcium Replacement - Follow Nurse / BPA Driven Protocol, , Does not apply, PRN, Justin Baldwin III, DO  •  carvedilol (COREG) tablet 25 mg, 25 mg, Oral, BID With Meals, Amarilis Chand PA-C, 25 mg at 04/22/23 0858  •  cefTRIAXone (ROCEPHIN) 1 g/100 mL 0.9% NS (MBP), 1 g, Intravenous, Q24H, Justin Baldwin III, DO, 1 g at 04/21/23 2333  •  doxycycline (VIBRAMYCIN) 100 mg in sodium chloride 0.9 % 250 mL IVPB-VTB, 100 mg, Intravenous, Q12H, Tarah Tinsley PA-C, 100 mg at 04/22/23 0858  •  HYDROcodone-acetaminophen (NORCO) 5-325 MG per tablet 1 tablet, 1 tablet, Oral, Q4H PRN, Justin Baldwin III, DO, 1 tablet at 04/21/23 2229  •  magnesium oxide (MAG-OX) tablet 400 mg, 400 mg, Oral, Daily, Sharri Kingsley MD, 400 mg at 04/22/23 0858  •  Magnesium Standard Dose Replacement - Follow Nurse / BPA Driven Protocol, , Does not apply, PRN, Justin Baldwin  "Marcelino III, DO  •  ondansetron (ZOFRAN) injection 4 mg, 4 mg, Intravenous, Q6H PRN, Justin Baldwin III, DO  •  pantoprazole (PROTONIX) EC tablet 40 mg, 40 mg, Oral, Q AM, Tarah Tinsley PA-C, 40 mg at 04/22/23 0535  •  Pharmacy Consult - MT, , Does not apply, Daily, Phillip Forrester, [facility]  •  Phosphorus Replacement - Follow Nurse / BPA Driven Protocol, , Does not apply, PRN, Justin Baldwin III, DO  •  Potassium Replacement - Follow Nurse / BPA Driven Protocol, , Does not apply, PRN, Justin Baldwin III, DO  •  Propofol (DIPRIVAN) injection 20 mg, 20 mg, Intravenous, Once, Sierra RidgeSimon flood, DO  •  sodium chloride 0.9 % flush 10 mL, 10 mL, Intravenous, PRN, Simon Mccormack, DO  •  sodium chloride 0.9 % flush 10 mL, 10 mL, Intravenous, Q12H, Justin Baldwin III, DO, 10 mL at 04/22/23 0859  •  sodium chloride 0.9 % flush 10 mL, 10 mL, Intravenous, PRN, Justin Baldwin III, DO  •  sodium chloride 0.9 % infusion 40 mL, 40 mL, Intravenous, PRN, Justin Baldwin III, DO    Vital Sign Min/Max for last 24 hours  Temp  Min: 96.6 °F (35.9 °C)  Max: 98.6 °F (37 °C)   BP  Min: 106/66  Max: 155/93   Pulse  Min: 77  Max: 90   Resp  Min: 14  Max: 18   SpO2  Min: 85 %  Max: 94 %   Flow (L/min)  Min: 2  Max: 3   Weight  Min: 105 kg (231 lb 7.7 oz)  Max: 105 kg (231 lb 7.7 oz)     Flowsheet Rows    Flowsheet Row First Filed Value   Admission Height 185.4 cm (73\") Documented at 04/20/2023 1554   Admission Weight 104 kg (230 lb) Documented at 04/20/2023 1554            Intake/Output Summary (Last 24 hours) at 4/22/2023 1138  Last data filed at 4/21/2023 2333  Gross per 24 hour   Intake 645 ml   Output 1225 ml   Net -580 ml       Physical Exam:     General Appearance:    Alert, cooperative, in no acute distress   Lungs:     Clear to auscultation bilaterally,respirations regular, even and                unlabored    Heart:    Regular rhythm and normal rate, normal S1 and S2,   no        " murmur, no gallop, no rub, no click   Chest Wall:    No abnormalities observed   Abdomen:     Normal bowel sounds, no masses, no organomegaly, soft      nontender, nondistended, no guarding, no rebound                tenderness   Extremities:  Moves all extremities well, +trace edema, no cyanosis, no            redness              Pulses:   Pulses palpable and equal bilaterally   Skin:   No bleeding, bruising or rash        Results Review:   Results from last 7 days   Lab Units 04/21/23  0513 04/20/23  1621   WBC 10*3/mm3 8.91 6.96   HEMOGLOBIN g/dL 13.8 14.5   HEMATOCRIT % 43.8 47.2   PLATELETS 10*3/mm3 325 356     Results from last 7 days   Lab Units 04/22/23  0505 04/21/23  0513 04/20/23  1621   SODIUM mmol/L 140 147* 137   POTASSIUM mmol/L 4.1 4.3 4.1   CHLORIDE mmol/L 101 106 101   CO2 mmol/L 27.0 27.0 24.0   BUN mg/dL 19 18 15   CREATININE mg/dL 1.36* 1.43* 1.35*   GLUCOSE mg/dL 116* 130* 123*              Results from last 7 days   Lab Units 04/21/23  0513 04/20/23  1921   TSH uIU/mL  --  10.040*   FREE T4 ng/dL 1.31  --      Results from last 7 days   Lab Units 04/20/23  1621   PROBNP pg/mL 2,781.0*     Results from last 7 days   Lab Units 04/20/23  2145 04/20/23  1621   PROTIME Seconds 16.1* 16.5*   INR  1.30* 1.34*     Results from last 7 days   Lab Units 04/20/23  2144 04/20/23  1921 04/20/23  1621   HSTROP T ng/L 33* 39* 33*       Intake/Output Summary (Last 24 hours) at 4/22/2023 1138  Last data filed at 4/21/2023 2333  Gross per 24 hour   Intake 645 ml   Output 1225 ml   Net -580 ml       I personally viewed and interpreted the patient's EKG/Telemetry data    EKG: none for review today.   Telemetry: NSR     Ejection Fraction  No results found for: EF    Echo EF Estimated  No results found for: ECHOEFEST      Present on Admission:  • Acute respiratory failure with hypoxia  • GERD (gastroesophageal reflux disease)  • Acute congestive heart failure  • BPH with elevated PSA  • Coronary artery disease  involving native coronary artery of native heart  • COPD (chronic obstructive pulmonary disease)  • PAF (paroxysmal atrial fibrillation)  • Vocal cord cancer  • BRYCE (acute kidney injury)  • Pneumonia of both lungs due to infectious organism  • Atrial fibrillation with RVR  • Acute pulmonary edema  • Parainfluenza    Assessment & Plan   1. PAF:  - s/p ECV in ED on on 4/20/2023, maintaining NSR, Coreg increased  - continue Eliqius 5 mg BID.     2. Acute on Chronic CHF:  - likely exacerbated by atrial fibrillation   - s/p IV lasix with good urine output and clinically improved, on RA now with O2 sats 91-93%. Recommend home lasix 40 mg daily. Follow up BMP with PCP on Wednesday.   - follow up with cardiologist in one month - states he has an appointment at [facility] in [place].    - Echo 4/20/2023 with EF 55-60%   - on ASA, statin, Coreg    Plan for disposition: Ok home from cardiology standpoint as patient really wants to go home and has plans for close follow up with PCP on Wednesday and cardiology in [place] in one months. Discussed staying one more night but patient would like to go home.     Electronically signed by TONY Dhillon, 04/22/23, 11:38 AM EDT.

## 2023-04-22 NOTE — DISCHARGE SUMMARY
[facility] Medicine Services  DISCHARGE SUMMARY    Patient Name: Odell Anne  : 1950  MRN: [id number]    Date of Admission: 2023  6:00 PM  Date of Discharge:  23  Primary Care Physician: Dennis Colón MD    Consults     Date and Time Order Name Status Description    2023 12:34 AM Inpatient Cardiology Consult Completed           Hospital Course     Presenting Problem:   Dyspnea [R06.00]  Acute pulmonary edema [J81.0]    Active Hospital Problems    Diagnosis  POA   • **Acute respiratory failure with hypoxia [J96.01]  Yes   • Acute pulmonary edema [J81.0]  Yes   • Parainfluenza [B34.8]  Yes   • COPD (chronic obstructive pulmonary disease) [J44.9]  Yes   • PAF (paroxysmal atrial fibrillation) [I48.0]  Yes   • BRYCE (acute kidney injury) [N17.9]  Yes   • Pneumonia of both lungs due to infectious organism [J18.9]  Yes   • Atrial fibrillation with RVR [I48.91]  Yes   • BPH with elevated PSA [N40.0, R97.20]  Yes   • GERD (gastroesophageal reflux disease) [K21.9]  Yes   • Acute congestive heart failure [I50.9]  Yes   • Coronary artery disease involving native coronary artery of native heart [I25.10]  Yes   • Vocal cord cancer [C32.0]  Yes      Resolved Hospital Problems   No resolved problems to display.          Hospital Course:  Odell Anne is a 73 y.o. male ***      Discharge Follow Up Recommendations for outpatient labs/diagnostics:   ***    Day of Discharge     HPI:   ***    Review of Systems  ***    Vital Signs:   Temp:  [96.6 °F (35.9 °C)-98.6 °F (37 °C)] 96.6 °F (35.9 °C)  Heart Rate:  [77-90] 86  Resp:  [14-18] 14  BP: (106-155)/(66-93) 155/93  Flow (L/min):  [2-3] 2      Physical Exam:  ***    Pertinent  and/or Most Recent Results     LAB RESULTS:      Lab 23  0513 23  2145 23  2144 23  1921 23  1621   WBC 8.91  --   --   --  6.96   HEMOGLOBIN 13.8  --   --   --  14.5   HEMATOCRIT 43.8  --   --   --  47.2   PLATELETS 325  --    --   --  356   NEUTROS ABS 6.11  --   --   --  4.91   IMMATURE GRANS (ABS) 0.05  --   --   --  0.02   LYMPHS ABS 1.26  --   --   --  1.20   MONOS ABS 1.14*  --   --   --  0.56   EOS ABS 0.28  --   --   --  0.22   MCV 87.6  --   --   --  87.6   PROCALCITONIN  --   --   --  0.04  --    LACTATE  --   --  1.9  --   --    PROTIME  --  16.1*  --   --  16.5*   D DIMER QUANT  --   --   --   --  0.56         Lab 04/22/23  0505 04/21/23 0513 04/20/23 1921 04/20/23  1621   SODIUM 140 147*  --  137   POTASSIUM 4.1 4.3  --  4.1   CHLORIDE 101 106  --  101   CO2 27.0 27.0  --  24.0   ANION GAP 12.0 14.0  --  12.0   BUN 19 18  --  15   CREATININE 1.36* 1.43*  --  1.35*   EGFR 54.6* 51.4*  --  55.1*   GLUCOSE 116* 130*  --  123*   CALCIUM 8.7 9.1  --  9.2   MAGNESIUM 2.1 2.0  --   --    TSH  --   --  10.040*  --          Lab 04/21/23 0513 04/20/23  1621   TOTAL PROTEIN 6.7 7.6   ALBUMIN 3.9 4.2   GLOBULIN 2.8 3.4   ALT (SGPT) 17 21   AST (SGOT) 13 16   BILIRUBIN 0.6 0.6   ALK PHOS 71 73         Lab 04/20/23 2145 04/20/23 2144 04/20/23 1921 04/20/23  1621   PROBNP  --   --   --  2,781.0*   HSTROP T  --  33* 39* 33*   PROTIME 16.1*  --   --  16.5*   INR 1.30*  --   --  1.34*                 Brief Urine Lab Results     None        Microbiology Results (last 10 days)     Procedure Component Value - Date/Time    Blood Culture - Blood, Arm, Right [[id number]]  (Normal) Collected: 04/20/23 2144    Lab Status: Preliminary result Specimen: Blood from Arm, Right Updated: 04/21/23 2215     Blood Culture No growth at 24 hours    Respiratory Panel PCR w/COVID-19(SARS-CoV-2) ANNABEL/LYLE/EDI/PAD/COR/MAD/STEFANIE In-House, NP Swab in UTM/VTM, 3-4 HR TAT - Swab, Nasopharynx [[id number]]  (Abnormal) Collected: 04/20/23 2143    Lab Status: Final result Specimen: Swab from Nasopharynx Updated: 04/20/23 2250     ADENOVIRUS, PCR Not Detected     Coronavirus 229E Not Detected     Coronavirus HKU1 Not Detected     Coronavirus NL63 Not Detected      Coronavirus OC43 Not Detected     COVID19 Not Detected     Human Metapneumovirus Not Detected     Human Rhinovirus/Enterovirus Not Detected     Influenza A PCR Not Detected     Influenza B PCR Not Detected     Parainfluenza Virus 1 Not Detected     Parainfluenza Virus 2 Not Detected     Parainfluenza Virus 3 Detected     Parainfluenza Virus 4 Not Detected     RSV, PCR Not Detected     Bordetella pertussis pcr Not Detected     Bordetella parapertussis PCR Not Detected     Chlamydophila pneumoniae PCR Not Detected     Mycoplasma pneumo by PCR Not Detected    Narrative:      In the setting of a positive respiratory panel with a viral infection PLUS a negative procalcitonin without other underlying concern for bacterial infection, consider observing off antibiotics or discontinuation of antibiotics and continue supportive care. If the respiratory panel is positive for atypical bacterial infection (Bordetella pertussis, Chlamydophila pneumoniae, or Mycoplasma pneumoniae), consider antibiotic de-escalation to target atypical bacterial infection.    S. Pneumo Ag Urine or CSF - Urine, Urine, Clean Catch [[id number]]  (Normal) Collected: 04/20/23 2143    Lab Status: Final result Specimen: Urine, Clean Catch Updated: 04/21/23 1805     Strep Pneumo Ag Negative    Legionella Antigen, Urine - Urine, Urine, Clean Catch [[id number]]  (Normal) Collected: 04/20/23 2143    Lab Status: Final result Specimen: Urine, Clean Catch Updated: 04/21/23 1805     LEGIONELLA ANTIGEN, URINE Negative    Blood Culture - Blood, Arm, Left [[id number]]  (Normal) Collected: 04/20/23 2132    Lab Status: Preliminary result Specimen: Blood from Arm, Left Updated: 04/21/23 2215     Blood Culture No growth at 24 hours          XR Chest 1 View    Result Date: 4/20/2023  XR CHEST 1 VW Date of Exam: 4/20/2023 6:04 PM EDT Indication: CHF/COPD protocol. Comparison: 10/26/2019 Findings: Patchy airspace disease is seen within the lower lobes.. There is a small  left-sided pleural effusion. Large pleural bleb is seen within the right upper lobe region.. No pneumothorax. The pulmonary vasculature appears mildly indistinct. Chronic interstitial changes are present. The cardiac and mediastinal silhouette appear unremarkable. No acute osseous abnormality identified.     Impression: Bibasilar pneumonia with suspected small left-sided pleural effusion. Follow-up to resolution recommended. Electronically Signed: Gaby Richard  4/20/2023 6:36 PM EDT  Workstation ID: [id number]                  Plan for Follow-up of Pending Labs/Results: ***  Pending Labs     Order Current Status    Blood Culture - Blood, Arm, Left Preliminary result    Blood Culture - Blood, Arm, Right Preliminary result        Discharge Details        Discharge Medications      New Medications      Instructions Start Date   furosemide 40 MG tablet  Commonly known as: LASIX   40 mg, Oral, Daily      potassium chloride 10 MEQ CR capsule  Commonly known as: MICRO-K   10 mEq, Oral, Daily         ASK your doctor about these medications      Instructions Start Date   albuterol sulfate  (90 Base) MCG/ACT inhaler  Commonly known as: PROVENTIL HFA;VENTOLIN HFA;PROAIR HFA   2 puffs, Inhalation, Every 4 Hours PRN      apixaban 5 MG tablet tablet  Commonly known as: ELIQUIS   5 mg, Oral, 2 Times Daily      aspirin 81 MG chewable tablet   81 mg, Oral, Daily      atorvastatin 40 MG tablet  Commonly known as: LIPITOR   40 mg, Oral, Daily      Breztri Aerosphere 160-9-4.8 MCG/ACT aerosol inhaler  Generic drug: Budeson-Glycopyrrol-Formoterol   2 puffs, Inhalation, 2 Times Daily      carvedilol 12.5 MG tablet  Commonly known as: COREG   18.75 mg, Oral, 2 Times Daily With Meals      tamsulosin 0.4 MG capsule 24 hr capsule  Commonly known as: FLOMAX   1 capsule, Oral, Nightly             No Known Allergies      Discharge Disposition:      Diet:  Hospital:  Diet Order   Procedures   • Diet: Cardiac Diets; Healthy Heart (2-3 Na+);  Texture: Regular Texture (IDDSI 7); Fluid Consistency: Thin (IDDSI 0)       Activity:      Restrictions or Other Recommendations:  ***       CODE STATUS:    Code Status and Medical Interventions:   Ordered at: 04/20/23 2125     Level Of Support Discussed With:    Patient     Code Status (Patient has no pulse and is not breathing):    CPR (Attempt to Resuscitate)     Medical Interventions (Patient has pulse or is breathing):    Full Support       No future appointments.    Additional Instructions for the Follow-ups that You Need to Schedule     Basic Metabolic Panel    Apr 26, 2023 (Approximate)      With PCP    Order Comments: With PCP     Release to patient: Routine Release                     Maisha Ohara MD  04/22/23      Time Spent on Discharge:  I spent  ***  minutes on this discharge activity which included: face-to-face encounter with the patient, reviewing the data in the system, coordination of the care with the nursing staff as well as consultants, documentation, and entering orders.             Release to patient: Routine Release                     Maisha Ohara MD  04/22/23      Time Spent on Discharge:  I spent  35  minutes on this discharge activity which included: face-to-face encounter with the patient, reviewing the data in the system, coordination of the care with the nursing staff as well as consultants, documentation, and entering orders.

## 2023-04-22 NOTE — PROGRESS NOTES
[facility] Medicine Services  PROGRESS NOTE    Patient Name: Odell Anne  : 1950  MRN: [id number]    Date of Admission: 2023  Primary Care Physician: Dennis Colón MD    Subjective   Subjective     CC:  ***    HPI:  ***    ROS:  ***     Objective   Objective     Vital Signs:   Temp:  [96.6 °F (35.9 °C)-98.6 °F (37 °C)] 96.6 °F (35.9 °C)  Heart Rate:  [77-90] 86  Resp:  [14-18] 14  BP: (106-155)/(66-93) 155/93  Flow (L/min):  [2-3] 2     Physical Exam:  ***    Results Reviewed:  LAB RESULTS:      Lab 23  0523  1621   WBC 8.91  --   --   --  6.96   HEMOGLOBIN 13.8  --   --   --  14.5   HEMATOCRIT 43.8  --   --   --  47.2   PLATELETS 325  --   --   --  356   NEUTROS ABS 6.11  --   --   --  4.91   IMMATURE GRANS (ABS) 0.05  --   --   --  0.02   LYMPHS ABS 1.26  --   --   --  1.20   MONOS ABS 1.14*  --   --   --  0.56   EOS ABS 0.28  --   --   --  0.22   MCV 87.6  --   --   --  87.6   PROCALCITONIN  --   --   --  0.04  --    LACTATE  --   --  1.9  --   --    PROTIME  --  16.1*  --   --  16.5*   D DIMER QUANT  --   --   --   --  0.56         Lab 23  0505 23  0523  1621   SODIUM 140 147*  --  137   POTASSIUM 4.1 4.3  --  4.1   CHLORIDE 101 106  --  101   CO2 27.0 27.0  --  24.0   ANION GAP 12.0 14.0  --  12.0   BUN 19 18  --  15   CREATININE 1.36* 1.43*  --  1.35*   EGFR 54.6* 51.4*  --  55.1*   GLUCOSE 116* 130*  --  123*   CALCIUM 8.7 9.1  --  9.2   MAGNESIUM 2.1 2.0  --   --    TSH  --   --  10.040*  --          Lab 23  0513 23  1621   TOTAL PROTEIN 6.7 7.6   ALBUMIN 3.9 4.2   GLOBULIN 2.8 3.4   ALT (SGPT) 17 21   AST (SGOT) 13 16   BILIRUBIN 0.6 0.6   ALK PHOS 71 73         Lab 23  2145 23  2144 23  1921 23  1621   PROBNP  --   --   --  2,781.0*   HSTROP T  --  33* 39* 33*   PROTIME 16.1*  --   --  16.5*   INR 1.30*  --   --  1.34*                  Brief Urine Lab Results     None          Microbiology Results Abnormal     Procedure Component Value - Date/Time    Blood Culture - Blood, Arm, Left [[id number]]  (Normal) Collected: 04/20/23 2132    Lab Status: Preliminary result Specimen: Blood from Arm, Left Updated: 04/21/23 2215     Blood Culture No growth at 24 hours    Blood Culture - Blood, Arm, Right [[id number]]  (Normal) Collected: 04/20/23 2144    Lab Status: Preliminary result Specimen: Blood from Arm, Right Updated: 04/21/23 2215     Blood Culture No growth at 24 hours    S. Pneumo Ag Urine or CSF - Urine, Urine, Clean Catch [[id number]]  (Normal) Collected: 04/20/23 2143    Lab Status: Final result Specimen: Urine, Clean Catch Updated: 04/21/23 1805     Strep Pneumo Ag Negative    Legionella Antigen, Urine - Urine, Urine, Clean Catch [[id number]]  (Normal) Collected: 04/20/23 2143    Lab Status: Final result Specimen: Urine, Clean Catch Updated: 04/21/23 1805     LEGIONELLA ANTIGEN, URINE Negative          XR Chest 1 View    Result Date: 4/20/2023  XR CHEST 1 VW Date of Exam: 4/20/2023 6:04 PM EDT Indication: CHF/COPD protocol. Comparison: 10/26/2019 Findings: Patchy airspace disease is seen within the lower lobes.. There is a small left-sided pleural effusion. Large pleural bleb is seen within the right upper lobe region.. No pneumothorax. The pulmonary vasculature appears mildly indistinct. Chronic interstitial changes are present. The cardiac and mediastinal silhouette appear unremarkable. No acute osseous abnormality identified.     Impression: Impression: Bibasilar pneumonia with suspected small left-sided pleural effusion. Follow-up to resolution recommended. Electronically Signed: Gaby Richard  4/20/2023 6:36 PM EDT  Workstation ID: [id number]          Current medications:  Scheduled Meds:apixaban, 5 mg, Oral, Q12H  aspirin, 81 mg, Oral, Daily  atorvastatin, 40 mg, Oral, Daily  carvedilol, 25 mg, Oral, BID With Meals  cefTRIAXone, 1 g,  Intravenous, Q24H  doxycycline, 100 mg, Intravenous, Q12H  magnesium oxide, 400 mg, Oral, Daily  pantoprazole, 40 mg, Oral, Q AM  pharmacy consult - MTM, , Does not apply, Daily  Propofol, 20 mg, Intravenous, Once  sodium chloride, 10 mL, Intravenous, Q12H      Continuous Infusions:   PRN Meds:.•  acetaminophen  •  Calcium Replacement - Follow Nurse / BPA Driven Protocol  •  HYDROcodone-acetaminophen  •  Magnesium Standard Dose Replacement - Follow Nurse / BPA Driven Protocol  •  ondansetron  •  Phosphorus Replacement - Follow Nurse / BPA Driven Protocol  •  Potassium Replacement - Follow Nurse / BPA Driven Protocol  •  sodium chloride  •  sodium chloride  •  sodium chloride    Assessment & Plan   Assessment & Plan     Active Hospital Problems    Diagnosis  POA   • **Acute respiratory failure with hypoxia [J96.01]  Yes   • Acute pulmonary edema [J81.0]  Yes   • Parainfluenza [B34.8]  Yes   • COPD (chronic obstructive pulmonary disease) [J44.9]  Yes   • PAF (paroxysmal atrial fibrillation) [I48.0]  Yes   • BRYCE (acute kidney injury) [N17.9]  Yes   • Pneumonia of both lungs due to infectious organism [J18.9]  Yes   • Atrial fibrillation with RVR [I48.91]  Yes   • BPH with elevated PSA [N40.0, R97.20]  Yes   • GERD (gastroesophageal reflux disease) [K21.9]  Yes   • Acute congestive heart failure [I50.9]  Yes   • Coronary artery disease involving native coronary artery of native heart [I25.10]  Yes   • Vocal cord cancer [C32.0]  Yes      Resolved Hospital Problems   No resolved problems to display.        Brief Hospital Course to date:  Odell Anne is a 73 y.o. male w paroxysmal atrial fibrillation who presents with dyspnea. Found to be in unstable Afib, s/p cardioversion in ED. Some pulm edema, borderline on O2 currently. Incidentally found to be parainfluenza++.      Paroxysmal Afib c/b Afib w RVR requiring cardioversion  -eliquis, coreg increase per cardiology  -ECHO pending  -TSH elevated but free T4 normal, not  contributor     CHF (data deficit) w acute exacerbation, likely triggered by Afib w RVR  -lasix per cards, ECHO pending  -not acute hypoxic resp failure (requring 1 liter n/c)     Parainfluenza +, bibasilar infiltrates c/w PNA   -on rocephin + doxy now out of abundance of caution     CKDIIIa at baseline, not BRYCE -restart lisinopril soon  COPD, former smoker - no home O2  CAD s/p CABG - ASA, statin, coreg  BPH - flomax  GERD - ppi     Expected Discharge Location and Transportation: home  Expected Discharge ***  Expected Discharge Date and Time     Expected Discharge Date Expected Discharge Time    Apr 22, 2023            DVT prophylaxis:  Medical DVT prophylaxis orders are present.          CODE STATUS:   Code Status and Medical Interventions:   Ordered at: 04/20/23 2125     Level Of Support Discussed With:    Patient     Code Status (Patient has no pulse and is not breathing):    CPR (Attempt to Resuscitate)     Medical Interventions (Patient has pulse or is breathing):    Full Support       Maisha Ohara MD  04/22/23

## 2023-04-22 NOTE — PLAN OF CARE
Goal Outcome Evaluation:  Plan of Care Reviewed With: patient        Progress: improving   Pt being discharged. IV removed and tele box returned to [facility].

## 2023-04-22 NOTE — OUTREACH NOTE
Prep Survey    Flowsheet Row Responses   [facility] facility patient discharged from? [place]   Is LACE score < 7 ? No   Eligibility Readm Mgmt   Discharge diagnosis Pneumonia of both lungs due to infectious organism   Does the patient have one of the following disease processes/diagnoses(primary or secondary)? Pneumonia   Does the patient have Home health ordered? No   Is there a DME ordered? No   Prep survey completed? Yes          Lucia KABA - Registered Nurse

## 2023-04-24 LAB
QT INTERVAL: 396 MS
QTC INTERVAL: 487 MS

## 2023-04-25 LAB
BACTERIA SPEC AEROBE CULT: NORMAL
BACTERIA SPEC AEROBE CULT: NORMAL

## 2023-04-26 ENCOUNTER — READMISSION MANAGEMENT (OUTPATIENT)
Dept: CALL CENTER | Facility: HOSPITAL | Age: 73
End: 2023-04-26
Payer: MEDICARE

## 2023-04-26 LAB
QT INTERVAL: 374 MS
QT INTERVAL: 384 MS
QTC INTERVAL: 459 MS
QTC INTERVAL: 465 MS

## 2023-04-26 NOTE — OUTREACH NOTE
COPD/PN Week 1 Survey    Flowsheet Row Responses   [facility] facility patient discharged from? [place]   Does the patient have one of the following disease processes/diagnoses(primary or secondary)? Pneumonia   Week 1 attempt successful? No   Unsuccessful attempts Attempt 1  [attempted pt and wife]          RAÚL WELCH - Registered Nurse

## 2023-04-28 NOTE — PROGRESS NOTES
"Enter Query Response Below      Query Response:       Acute on chronic heart failure with recovered ejection fraction       If applicable, please update the problem list.     Patient: Odell Anne        : 1950  Account: [id number]           Admit Date: 2023        How to Respond to this query:       a. Click New Note     b. Answer query within the yellow box.                c. Update the Problem List, if applicable.      If you have any questions about this query contact me at: [email or web address]    ,    74 yo male presented with \"atrial fibrillation with RVR, pulmonary edema, and pneumonia. Cardioverted in ED.\" per H&P.  Patient with a history of chronic diastolic CHF, CAD s/p CABG, PAF, HTN, COPD.  () Cardiology consult includes \"CHF - systolic ECHO 2019 EF 40-45%, last ECHO normalized - unknown date.\"  () Cardiology progress note \"Acute on Chronic CHF: - likely exacerbated by atrial fibrillation.\" () Echo EF 55-60%. PROBNP 2781.  Treatment includes monitoring, oxygen, Cardiology consult, IV Lasix, PO Aspirin and Coreg.  Patient discharged with 40mg Lasix daily.       Can this be further clarified as:    Acute on chronic systolic heart failure  Acute on chronic heart failure with recovered ejection fraction  Other, please specify: ______________  Unable to determine    By submitting this query, we are merely seeking further clarification of documentation to accurately reflect all conditions that you are monitoring, evaluating, treating or that extend the hospitalization or utilize additional resources of care. Please utilize your independent clinical judgment when addressing the question(s) above.     This query and your response, once completed, will be entered into the legal medical record.    Sincerely,  Shaniqua Miles RN,[facility]  Clinical Documentation Integrity Program   "

## 2023-05-03 ENCOUNTER — READMISSION MANAGEMENT (OUTPATIENT)
Dept: CALL CENTER | Facility: HOSPITAL | Age: 73
End: 2023-05-03
Payer: MEDICARE

## 2023-05-03 NOTE — OUTREACH NOTE
COPD/PN Week 2 Survey    Flowsheet Row Responses   [facility] patient discharged from? [place]   Does the patient have one of the following disease processes/diagnoses(primary or secondary)? Pneumonia   Week 2 attempt successful? Yes   Call start time 0850   Call end time 0853   Discharge diagnosis Pneumonia of both lungs due to infectious organism   Meds reviewed with patient/caregiver? Yes   Is the patient having any side effects they believe may be caused by any medication additions or changes? No   Does the patient have all medications ordered at discharge? Yes   Is the patient taking all medications as directed (includes completed medication regime)? Yes   Medication comments Lasix and KCL have been discontinued since discharge   Comments regarding appointments Cardiology f/u on 5/4/23   Does the patient have a primary care provider?  Yes   Comments regarding PCP Pt has completed PCP f/u since discharge   Has the patient kept scheduled appointments due by today? Yes   Has home health visited the patient within 72 hours of discharge? N/A   Psychosocial issues? No   Did the patient receive a copy of their discharge instructions? Yes   Nursing interventions Reviewed instructions with patient   What is the patient's perception of their health status since discharge? Improving  [Feels improved--no edema or wt gain.  Some CRANE at times--will follow w/cardiology tomorrow.  Pt monitors his pulse, BP, QD wts--education provided.]   Nursing Interventions Nurse provided patient education   Week 2 call completed? Yes  [Improving--no immediate needs, appts in place]          RAÚL WELCH - Registered Nurse

## 2023-11-06 PROCEDURE — P9612 CATHETERIZE FOR URINE SPEC: HCPCS

## 2023-11-06 PROCEDURE — 36415 COLL VENOUS BLD VENIPUNCTURE: CPT

## 2023-11-06 PROCEDURE — 99284 EMERGENCY DEPT VISIT MOD MDM: CPT

## 2023-11-07 ENCOUNTER — APPOINTMENT (OUTPATIENT)
Dept: GENERAL RADIOLOGY | Facility: HOSPITAL | Age: 73
End: 2023-11-07
Payer: MEDICARE

## 2023-11-07 ENCOUNTER — HOSPITAL ENCOUNTER (EMERGENCY)
Facility: HOSPITAL | Age: 73
Discharge: HOME | End: 2023-11-07
Attending: EMERGENCY MEDICINE | Admitting: EMERGENCY MEDICINE
Payer: MEDICARE

## 2023-11-07 VITALS
OXYGEN SATURATION: 95 % | SYSTOLIC BLOOD PRESSURE: 156 MMHG | HEIGHT: 74 IN | HEART RATE: 83 BPM | WEIGHT: 220 LBS | DIASTOLIC BLOOD PRESSURE: 98 MMHG | TEMPERATURE: 97.8 F | RESPIRATION RATE: 16 BRPM | BODY MASS INDEX: 28.23 KG/M2

## 2023-11-07 DIAGNOSIS — R06.00 DYSPNEA, UNSPECIFIED TYPE: ICD-10-CM

## 2023-11-07 DIAGNOSIS — R33.9 URINARY RETENTION: Primary | ICD-10-CM

## 2023-11-07 LAB
ALBUMIN SERPL-MCNC: 4 G/DL (ref 3.5–5.2)
ALBUMIN/GLOB SERPL: 1.4 G/DL
ALP SERPL-CCNC: 55 U/L (ref 39–117)
ALT SERPL W P-5'-P-CCNC: 12 U/L (ref 1–41)
ANION GAP SERPL CALCULATED.3IONS-SCNC: 13 MMOL/L (ref 5–15)
ANISOCYTOSIS BLD QL: ABNORMAL
AST SERPL-CCNC: 13 U/L (ref 1–40)
BACTERIA UR QL AUTO: ABNORMAL /HPF
BASOPHILS # BLD MANUAL: 0.07 10*3/MM3 (ref 0–0.2)
BASOPHILS NFR BLD MANUAL: 1 % (ref 0–1.5)
BILIRUB SERPL-MCNC: 0.6 MG/DL (ref 0–1.2)
BILIRUB UR QL STRIP: NEGATIVE
BUN SERPL-MCNC: 18 MG/DL (ref 8–23)
BUN/CREAT SERPL: 13.8 (ref 7–25)
CALCIUM SPEC-SCNC: 9.2 MG/DL (ref 8.6–10.5)
CHLORIDE SERPL-SCNC: 101 MMOL/L (ref 98–107)
CLARITY UR: ABNORMAL
CO2 SERPL-SCNC: 23 MMOL/L (ref 22–29)
COLOR UR: ABNORMAL
CREAT SERPL-MCNC: 1.3 MG/DL (ref 0.76–1.27)
D DIMER PPP FEU-MCNC: 0.59 MG/L (FEU) (ref 0–0.74)
DACRYOCYTES BLD QL SMEAR: ABNORMAL
DEPRECATED RDW RBC AUTO: 49.9 FL (ref 37–54)
EGFRCR SERPLBLD CKD-EPI 2021: 57.6 ML/MIN/1.73
EOSINOPHIL # BLD MANUAL: 0.21 10*3/MM3 (ref 0–0.4)
EOSINOPHIL NFR BLD MANUAL: 3 % (ref 0.3–6.2)
ERYTHROCYTE [DISTWIDTH] IN BLOOD BY AUTOMATED COUNT: 16.4 % (ref 12.3–15.4)
GIANT PLATELETS: ABNORMAL
GLOBULIN UR ELPH-MCNC: 2.9 GM/DL
GLUCOSE SERPL-MCNC: 147 MG/DL (ref 65–99)
GLUCOSE UR STRIP-MCNC: NEGATIVE MG/DL
HCT VFR BLD AUTO: 45.8 % (ref 37.5–51)
HGB BLD-MCNC: 14.9 G/DL (ref 13–17.7)
HGB UR QL STRIP.AUTO: NEGATIVE
HOLD SPECIMEN: NORMAL
HYALINE CASTS UR QL AUTO: ABNORMAL /LPF
KETONES UR QL STRIP: NEGATIVE
LARGE PLATELETS: ABNORMAL
LEUKOCYTE ESTERASE UR QL STRIP.AUTO: ABNORMAL
LYMPHOCYTES # BLD MANUAL: 1.31 10*3/MM3 (ref 0.7–3.1)
LYMPHOCYTES NFR BLD MANUAL: 3 % (ref 5–12)
MCH RBC QN AUTO: 26.9 PG (ref 26.6–33)
MCHC RBC AUTO-ENTMCNC: 32.4 G/DL (ref 31.5–35.7)
MCV RBC AUTO: 83 FL (ref 79–97)
MICROCYTES BLD QL: ABNORMAL
MONOCYTES # BLD: 0.21 10*3/MM3 (ref 0.1–0.9)
NEUTROPHILS # BLD AUTO: 5.11 10*3/MM3 (ref 1.7–7)
NEUTROPHILS NFR BLD MANUAL: 73 % (ref 42.7–76)
NEUTS BAND NFR BLD MANUAL: 1 % (ref 0–5)
NITRITE UR QL STRIP: NEGATIVE
NT-PROBNP SERPL-MCNC: 1920 PG/ML (ref 0–900)
PH UR STRIP.AUTO: 5.5 [PH] (ref 5–8)
PLATELET # BLD AUTO: 267 10*3/MM3 (ref 140–450)
PMV BLD AUTO: 9.1 FL (ref 6–12)
POIKILOCYTOSIS BLD QL SMEAR: ABNORMAL
POTASSIUM SERPL-SCNC: 3.9 MMOL/L (ref 3.5–5.2)
PROT SERPL-MCNC: 6.9 G/DL (ref 6–8.5)
PROT UR QL STRIP: ABNORMAL
QT INTERVAL: 411 MS
QTC INTERVAL: 500 MS
RBC # BLD AUTO: 5.52 10*6/MM3 (ref 4.14–5.8)
RBC # UR STRIP: ABNORMAL /HPF
REF LAB TEST METHOD: ABNORMAL
SCAN SLIDE: NORMAL
SMALL PLATELETS BLD QL SMEAR: ADEQUATE
SODIUM SERPL-SCNC: 137 MMOL/L (ref 136–145)
SP GR UR STRIP: 1.03 (ref 1–1.03)
SPERM URNS QL MICRO: ABNORMAL /HPF
SQUAMOUS #/AREA URNS HPF: ABNORMAL /HPF
TROPONIN T SERPL HS-MCNC: 28 NG/L
TROPONIN T SERPL HS-MCNC: 29 NG/L
UROBILINOGEN UR QL STRIP: ABNORMAL
VARIANT LYMPHS NFR BLD MANUAL: 12 % (ref 19.6–45.3)
VARIANT LYMPHS NFR BLD MANUAL: 7 % (ref 0–5)
WBC # UR STRIP: ABNORMAL /HPF
WBC MORPH BLD: NORMAL
WBC NRBC COR # BLD: 6.9 10*3/MM3 (ref 3.4–10.8)
WHOLE BLOOD HOLD COAG: NORMAL
WHOLE BLOOD HOLD SPECIMEN: NORMAL

## 2023-11-07 PROCEDURE — P9612 CATHETERIZE FOR URINE SPEC: HCPCS

## 2023-11-07 PROCEDURE — 81001 URINALYSIS AUTO W/SCOPE: CPT | Performed by: PHYSICIAN ASSISTANT

## 2023-11-07 PROCEDURE — 71045 X-RAY EXAM CHEST 1 VIEW: CPT

## 2023-11-07 PROCEDURE — 85025 COMPLETE CBC W/AUTO DIFF WBC: CPT | Performed by: PHYSICIAN ASSISTANT

## 2023-11-07 PROCEDURE — 85007 BL SMEAR W/DIFF WBC COUNT: CPT | Performed by: PHYSICIAN ASSISTANT

## 2023-11-07 PROCEDURE — 84484 ASSAY OF TROPONIN QUANT: CPT | Performed by: PHYSICIAN ASSISTANT

## 2023-11-07 PROCEDURE — 83880 ASSAY OF NATRIURETIC PEPTIDE: CPT | Performed by: PHYSICIAN ASSISTANT

## 2023-11-07 PROCEDURE — 36415 COLL VENOUS BLD VENIPUNCTURE: CPT

## 2023-11-07 PROCEDURE — 93005 ELECTROCARDIOGRAM TRACING: CPT | Performed by: PHYSICIAN ASSISTANT

## 2023-11-07 PROCEDURE — 85379 FIBRIN DEGRADATION QUANT: CPT | Performed by: PHYSICIAN ASSISTANT

## 2023-11-07 PROCEDURE — 80053 COMPREHEN METABOLIC PANEL: CPT | Performed by: PHYSICIAN ASSISTANT

## 2023-11-07 RX ORDER — SODIUM CHLORIDE 0.9 % (FLUSH) 0.9 %
10 SYRINGE (ML) INJECTION AS NEEDED
Status: DISCONTINUED | OUTPATIENT
Start: 2023-11-07 | End: 2023-11-07 | Stop reason: HOSPADM

## 2023-11-07 NOTE — ED PROVIDER NOTES
"Subjective   History of Present Illness  Chief Complaint: Shortness of breath, urinary retention    Patient is a 73-year-old  male with history of CAD, GERD, previous MI, CHF, COPD A-fib presents to the ER with multiple complaints.  Patient reports shortness of breath and exertional dyspnea for the last couple days.  He states he has issues with heart failure and does take a diuretic.  He states that he took increased dose of his Lasix and \"lost 13 pounds last week\".  He was reported feeling fatigued and short of breath last week but states today his shortness of breath is no different than his normal.  Patient does have some right lower extremity swelling that he reports is chronic, he had his vein graft from this leg.  No cough or congestion.  Patient also complains of some difficulty with urination.  He states he called EMS today because he woke up this evening and felt like he could not urinate.  Patient's last normal urination was 10 PM last night, 2.5 hours ago.  He states that he peed a normal amount.  He reports some right lower quadrant pain that radiates into the back at that time but not currently.  No hematuria.  No history of kidney stones.  No headache lightheadedness or dizziness.  Patient does take Eliquis for history of A-fib.    PCP: Dennis Colón    History provided by:  Patient      Review of Systems   Constitutional:  Negative for chills and fever.   HENT:  Negative for congestion, sore throat and trouble swallowing.    Eyes: Negative.    Respiratory:  Positive for shortness of breath. Negative for cough, chest tightness and wheezing.    Cardiovascular:  Positive for leg swelling. Negative for chest pain.   Gastrointestinal:  Negative for abdominal pain, diarrhea, nausea and vomiting.   Endocrine: Negative.    Genitourinary:  Positive for difficulty urinating and flank pain. Negative for dysuria.   Musculoskeletal:  Negative for myalgias.   Skin:  Negative for rash. "   Allergic/Immunologic: Negative.    Neurological:  Negative for weakness and headaches.   Psychiatric/Behavioral:  Negative for behavioral problems.    All other systems reviewed and are negative.      Past Medical History:   Diagnosis Date    Cancer     throat- radiation only    Coronary artery disease     GERD (gastroesophageal reflux disease)     Myocardial infarction     Status post coronary artery bypass graft        No Known Allergies    Past Surgical History:   Procedure Laterality Date    BYPASS GRAFT         Family History   Family history unknown: Yes       Social History     Socioeconomic History    Marital status:    Tobacco Use    Smoking status: Former    Smokeless tobacco: Never   Vaping Use    Vaping Use: Never used   Substance and Sexual Activity    Alcohol use: No    Drug use: Defer    Sexual activity: Defer           Objective   Physical Exam  Vitals and nursing note reviewed.   Constitutional:       General: He is not in acute distress.     Appearance: Normal appearance. He is normal weight. He is not diaphoretic.   HENT:      Head: Normocephalic.      Nose: Nose normal. No congestion.      Mouth/Throat:      Mouth: Mucous membranes are moist.      Pharynx: No oropharyngeal exudate.   Eyes:      Extraocular Movements: Extraocular movements intact.      Pupils: Pupils are equal, round, and reactive to light.   Cardiovascular:      Rate and Rhythm: Normal rate and regular rhythm.      Pulses: Normal pulses.      Heart sounds: Normal heart sounds. No murmur heard.  Pulmonary:      Effort: Pulmonary effort is normal.      Breath sounds: No rhonchi or rales.   Abdominal:      General: Abdomen is flat. There is no distension.      Palpations: Abdomen is soft.      Tenderness: There is no abdominal tenderness. There is no right CVA tenderness or left CVA tenderness.   Musculoskeletal:         General: Swelling present. No tenderness. Normal range of motion.      Cervical back: Normal range of  "motion.      Right lower leg: Edema present.      Left lower leg: No edema.   Skin:     General: Skin is warm.      Capillary Refill: Capillary refill takes less than 2 seconds.      Findings: Bruising present. No erythema.      Comments: Various bruises due to anticoagulation   Neurological:      General: No focal deficit present.      Mental Status: He is alert and oriented to person, place, and time.      Cranial Nerves: No cranial nerve deficit.      Motor: No weakness.   Psychiatric:         Mood and Affect: Mood normal.         Behavior: Behavior normal.         ECG 12 Lead      Date/Time: 11/7/2023 5:06 AM    Performed by: Claire Kat PA  Authorized by: Darren Peñaloza DO  Interpreted by physician  Previous ECG: no previous ECG available  Rhythm: atrial fibrillation  Ectopy: PVCs  Rate: normal  BPM: 85  QRS axis: normal  Clinical impression: non-specific ECG               ED Course  ED Course as of 11/07/23 0510   Tue Nov 07, 2023   0041 D-Dimer, Quant: 0.59  Age adjusted D dimer, negative [MC]   0115 HS Troponin T(!): 29  6 months ago, 33 [MC]   0116 proBNP(!): 1,920.0  2 months ago 2781 [MC]   0117 Bladder scan 86 mL [MC]      ED Course User Index  [MC] Claire Kat PA    /98   Pulse 83   Temp 97.8 °F (36.6 °C)   Resp 16   Ht 188 cm (74\")   Wt 99.8 kg (220 lb)   SpO2 95%   BMI 28.25 kg/m²   Labs Reviewed   COMPREHENSIVE METABOLIC PANEL - Abnormal; Notable for the following components:       Result Value    Glucose 147 (*)     Creatinine 1.30 (*)     eGFR 57.6 (*)     All other components within normal limits    Narrative:     GFR Normal >60  Chronic Kidney Disease <60  Kidney Failure <15    The GFR formula is only valid for adults with stable renal function between ages 18 and 70.   BNP (IN-HOUSE) - Abnormal; Notable for the following components:    proBNP 1,920.0 (*)     All other components within normal limits    Narrative:     This assay is used as an aid in the diagnosis of " individuals suspected of having heart failure. It can be used as an aid in the diagnosis of acute decompensated heart failure (ADHF) in patients presenting with signs and symptoms of ADHF to the emergency department (ED). In addition, NT-proBNP of <300 pg/mL indicates ADHF is not likely.    Age Range Result Interpretation  NT-proBNP Concentration (pg/mL:      <50             Positive            >450                   Gray                 300-450                    Negative             <300    50-75           Positive            >900                  Gray                300-900                  Negative            <300      >75             Positive            >1800                  Gray                300-1800                  Negative            <300   SINGLE HSTROPONIN T - Abnormal; Notable for the following components:    HS Troponin T 29 (*)     All other components within normal limits    Narrative:     High Sensitive Troponin T Reference Range:  <10.0 ng/L- Negative Female for AMI  <15.0 ng/L- Negative Male for AMI  >=10 - Abnormal Female indicating possible myocardial injury.  >=15 - Abnormal Male indicating possible myocardial injury.   Clinicians would have to utilize clinical acumen, EKG, Troponin, and serial changes to determine if it is an Acute Myocardial Infarction or myocardial injury due to an underlying chronic condition.        URINALYSIS W/ CULTURE IF INDICATED - Abnormal; Notable for the following components:    Color, UA Dark Yellow (*)     Appearance, UA Cloudy (*)     Protein, UA Trace (*)     Leuk Esterase, UA Trace (*)     All other components within normal limits    Narrative:     In absence of clinical symptoms, the presence of pyuria, bacteria, and/or nitrites on the urinalysis result does not correlate with infection.   CBC WITH AUTO DIFFERENTIAL - Abnormal; Notable for the following components:    RDW 16.4 (*)     All other components within normal limits    Narrative:     The previously  "reported component NRBC is no longer being reported. Previous result was 0.0 /100 WBC (Reference Range: 0.0-0.2 /100 WBC) on 11/7/2023 at 0031 EST.   MANUAL DIFFERENTIAL - Abnormal; Notable for the following components:    Lymphocyte % 12.0 (*)     Monocyte % 3.0 (*)     Atypical Lymphocyte % 7.0 (*)     All other components within normal limits   SINGLE HSTROPONIN T - Abnormal; Notable for the following components:    HS Troponin T 28 (*)     All other components within normal limits    Narrative:     High Sensitive Troponin T Reference Range:  <10.0 ng/L- Negative Female for AMI  <15.0 ng/L- Negative Male for AMI  >=10 - Abnormal Female indicating possible myocardial injury.  >=15 - Abnormal Male indicating possible myocardial injury.   Clinicians would have to utilize clinical acumen, EKG, Troponin, and serial changes to determine if it is an Acute Myocardial Infarction or myocardial injury due to an underlying chronic condition.        URINALYSIS, MICROSCOPIC ONLY - Abnormal; Notable for the following components:    Sperm, UA Small/1+ (*)     All other components within normal limits   D-DIMER, QUANTITATIVE - Normal    Narrative:     According to the assay 's published package insert, a normal (<0.50 mg/L (FEU)) D-dimer result in conjunction with a non-high clinical probability assessment, excludes deep vein thrombosis (DVT) and pulmonary embolism (PE) with high sensitivity.    D-dimer values increase with age and this can make VTE exclusion of an older population difficult. To address this, the [employer], based on best available evidence and recent guidelines, recommends that clinicians use age-adjusted D-dimer thresholds in patients greater than 50 years of age with: a) a low probability of PE who do not meet all Pulmonary Embolism Rule Out Criteria, or b) in those with intermediate probability of PE.   The formula for an age-adjusted D-dimer cut-off is \"age/100\".  For example, " a 60 year old patient would have an age-adjusted cut-off of 0.60 mg/L (FEU) and an 80 year old 0.80 mg/L (FEU).   RAINBOW DRAW    Narrative:     The following orders were created for panel order [facility] Draw.  Procedure                               Abnormality         Status                     ---------                               -----------         ------                     Green Top (Gel)[[id number]]                                  Final result               Lavender Top[[id number]]                                     Final result               Gold Top - SST[[id number]]                                   Final result               Light Blue Top[[id number]]                                   Final result                 Please view results for these tests on the individual orders.   SCAN SLIDE   CBC AND DIFFERENTIAL    Narrative:     The following orders were created for panel order CBC & Differential.  Procedure                               Abnormality         Status                     ---------                               -----------         ------                     CBC Auto Differential[[id number]]        Abnormal            Final result               Scan Slide[[id number]]                                       Final result                 Please view results for these tests on the individual orders.   GREEN TOP   LAVENDER TOP   GOLD TOP - SST   LIGHT BLUE TOP     Medications   sodium chloride 0.9 % flush 10 mL (has no administration in time range)     XR Chest 1 View    Result Date: 11/7/2023  Impression: No acute cardiopulmonary process. Stable cardiomegaly. Electronically Signed: Gaby Richard MD  11/7/2023 12:38 AM EST  Workstation ID: [id number]                                          Medical Decision Making  Differential Dx (Includes but not limited to): COPD exacerbation, CHF exacerbation, pneumonia, PE, urinary retention, stone  Medical Records Reviewed: 2D Echo 4/22/23  ·  Estimated left  ventricular EF = 65%  ·  Left ventricular wall thickness is consistent with mild concentric hypertrophy.  ·  Mild mitral valve regurgitation is present  ·  Mild to moderate aortic valve regurgitation is present.    Labs: On my interpretation urinalysis negative for UTI.  Troponin 28.  CBC no leukocytosis.  CMP glucose 147, creatinine 1.3.  BNP 1920.  D-dimer 0.59.  Imaging: On my interpretation, chest x-ray shows no obvious pneumonia or overt pulmonary edema  Telemetry: EKG interpretation: Atrial fibrillation with a rate 85 with PVCs, no acute ST changes.  Testing considered but not ordered: CT abdomen pelvis patient denies abdominal pain at this time.  Abdominal exam is benign.  Nature of Complaint: Acute  Admission vs Discharge: Discharge  Discussion: While in the ED IV was placed and labs were obtained appropriate PPE was worn during exam and throughout all encounters with the patient.  Patient had the above evaluation.  He is afebrile, nontoxic parents in no acute distress.  Patient told EMS he wanted to be evaluated for urinary retention.  Patient states that he did have a normal urination 2 hours prior to ER arrival.  Bladder scan showed 86 mL.  Patient likely unable to urinate because bladder is near empty.  On my evaluation patient did complain of some shortness of breath but states this was similar to his chronic shortness of breath due to his COPD.  There are no wheezes.  Denies cough or chest pain.  Lab work reassuring.  Troponin, BNP are improved compared to previous.  Dimer negative.  Chest x-ray shows no obvious pneumonia.  Vital signs stable.  I see no indication for admission at this time.  Patient was able to urinate independently, this is negative for infectious process.  Recommended follow-up with PCP and cardiology for recheck.    Discharge plan and instructions were discussed with the patient who verbalized understanding and is in agreement with the plan, all questions were answered at this  time.  Patient is aware of signs symptoms that would require immediate return to the emergency room.  Patient understands importance of following up with primary care provider for further evaluation and worsening concerns as well as blood pressure recheck in the next 4 weeks.    Patient was discharged in improved stable condition with an upright steady gait.    Patient is aware that discharge does not mean that nothing is wrong but indicates no emergencies present and they must continue care with follow-up as given below or physician of their choice.    Problems Addressed:  Dyspnea, unspecified type: acute illness or injury  Urinary retention: acute illness or injury    Amount and/or Complexity of Data Reviewed  External Data Reviewed: radiology.  Labs: ordered. Decision-making details documented in ED Course.  Radiology: ordered. Decision-making details documented in ED Course.  ECG/medicine tests: ordered. Decision-making details documented in ED Course.    Risk  Prescription drug management.        Final diagnoses:   Urinary retention   Dyspnea, unspecified type       ED Disposition  ED Disposition       ED Disposition   Discharge    Condition   Stable    Comment   --               Dennis Colón MD  [facility] [address] [place] [address]  [phone]    Schedule an appointment as soon as possible for a visit in 2 days  As needed, If symptoms worsen         Medication List      No changes were made to your prescriptions during this visit.            Claire Kat PA  11/07/23 0510

## 2023-11-07 NOTE — ED NOTES
Patient stated he was able to urinate at 10pm and it was a normal amount and did not have trouble urinating. States that it was when he was leaving to come to the hospital that he felt like he needed to urinate but was not able to. Provider made aware and patient is encouraged to try and urinate in the urinal and patient to be bladder scanned

## 2023-11-07 NOTE — DISCHARGE INSTRUCTIONS
Take medication at home as prescribed.    Follow-up with primary care and your cardiologist for recheck    Return to the ER for new or worsening symptoms

## 2024-01-01 ENCOUNTER — HOSPITAL ENCOUNTER (EMERGENCY)
Facility: HOSPITAL | Age: 74
End: 2024-02-28
Attending: EMERGENCY MEDICINE | Admitting: EMERGENCY MEDICINE
Payer: MEDICARE

## 2024-01-01 VITALS — RESPIRATION RATE: 15 BRPM | HEART RATE: 96 BPM | OXYGEN SATURATION: 80 %

## 2024-01-01 DIAGNOSIS — I46.9 CARDIAC ARREST: Primary | ICD-10-CM

## 2024-01-01 PROCEDURE — 92950 HEART/LUNG RESUSCITATION CPR: CPT

## 2024-01-01 PROCEDURE — 99285 EMERGENCY DEPT VISIT HI MDM: CPT

## 2024-01-01 PROCEDURE — 25010000002 EPINEPHRINE 1 MG/10ML SOLUTION PREFILLED SYRINGE: Performed by: EMERGENCY MEDICINE

## 2024-01-01 PROCEDURE — 31500 INSERT EMERGENCY AIRWAY: CPT

## 2024-01-01 RX ADMIN — EPINEPHRINE 1 MG: 0.1 INJECTION, SOLUTION ENDOTRACHEAL; INTRACARDIAC; INTRAVENOUS at 16:07

## 2024-01-01 RX ADMIN — EPINEPHRINE 1 MG: 0.1 INJECTION, SOLUTION ENDOTRACHEAL; INTRACARDIAC; INTRAVENOUS at 16:21

## 2024-01-01 RX ADMIN — EPINEPHRINE 1 MG: 0.1 INJECTION, SOLUTION ENDOTRACHEAL; INTRACARDIAC; INTRAVENOUS at 16:10

## 2024-01-01 RX ADMIN — EPINEPHRINE 1 MG: 0.1 INJECTION, SOLUTION ENDOTRACHEAL; INTRACARDIAC; INTRAVENOUS at 16:13

## 2024-01-01 RX ADMIN — EPINEPHRINE 1 MG: 0.1 INJECTION, SOLUTION ENDOTRACHEAL; INTRACARDIAC; INTRAVENOUS at 16:16

## 2024-01-01 RX ADMIN — EPINEPHRINE 1 MG: 0.1 INJECTION, SOLUTION ENDOTRACHEAL; INTRACARDIAC; INTRAVENOUS at 16:03

## 2024-02-28 NOTE — ED PROVIDER NOTES
Subjective   History of Present Illness  Patient is 73-year-old male who family found him at the base of approximately 15 steps unresponsive.  Upon EMS arrival patient was noted to be in cardiac arrest with asystole.  Family states that it had been approximately 2 hours since he had last seen him well.  Upon arrival to the ED CPR was in progress.      Review of Systems    Past Medical History:   Diagnosis Date    Cancer     throat- radiation only    Coronary artery disease     GERD (gastroesophageal reflux disease)     Myocardial infarction     Status post coronary artery bypass graft        No Known Allergies    Past Surgical History:   Procedure Laterality Date    BYPASS GRAFT         Family History   Family history unknown: Yes       Social History     Socioeconomic History    Marital status:    Tobacco Use    Smoking status: Former    Smokeless tobacco: Never   Vaping Use    Vaping Use: Never used   Substance and Sexual Activity    Alcohol use: No    Drug use: Defer    Sexual activity: Defer           Objective   Physical Exam  Initial physical exam showed the patient to have asystole without respiratory effort.  Patient had mottled skin and pupils were fixed and dilated.  Procedures     Under direct laryngoscopy a 7.5 endotracheal tube was placed without difficulty with symmetric breath sounds on Ambu bag ventilation with good CO2 change.      ED Course                                             Medical Decision Making  CPR was continued while in the ED.  Patient remained primarily asystole with short episodes of PEA and had brief episode of a palpable pulse lasting 20 or 30 seconds.  Patient received multiple rounds of epinephrine IV without response.  Cardiac arrest was stopped at that time.  I did speak to the patient's family which was on location.    Risk  Prescription drug management.        Final diagnoses:   Cardiac arrest       ED Disposition  ED Disposition       ED Disposition        Condition   --    Comment   --               No follow-up provider specified.       Medication List      No changes were made to your prescriptions during this visit.            Hans Jules MD  02/28/24 1926